# Patient Record
Sex: MALE | Race: WHITE | NOT HISPANIC OR LATINO | Employment: FULL TIME | URBAN - METROPOLITAN AREA
[De-identification: names, ages, dates, MRNs, and addresses within clinical notes are randomized per-mention and may not be internally consistent; named-entity substitution may affect disease eponyms.]

---

## 2018-02-19 ENCOUNTER — OFFICE VISIT (OUTPATIENT)
Dept: INTERNAL MEDICINE | Facility: CLINIC | Age: 28
End: 2018-02-19
Payer: COMMERCIAL

## 2018-02-19 VITALS
TEMPERATURE: 98 F | HEIGHT: 70 IN | SYSTOLIC BLOOD PRESSURE: 136 MMHG | BODY MASS INDEX: 37.08 KG/M2 | DIASTOLIC BLOOD PRESSURE: 70 MMHG | HEART RATE: 108 BPM | WEIGHT: 259 LBS | OXYGEN SATURATION: 96 %

## 2018-02-19 DIAGNOSIS — K21.9 GASTROESOPHAGEAL REFLUX DISEASE WITHOUT ESOPHAGITIS: ICD-10-CM

## 2018-02-19 DIAGNOSIS — E66.09 CLASS 2 OBESITY DUE TO EXCESS CALORIES WITHOUT SERIOUS COMORBIDITY WITH BODY MASS INDEX (BMI) OF 37.0 TO 37.9 IN ADULT: ICD-10-CM

## 2018-02-19 PROBLEM — E66.812 CLASS 2 OBESITY WITHOUT SERIOUS COMORBIDITY WITH BODY MASS INDEX (BMI) OF 37.0 TO 37.9 IN ADULT: Status: ACTIVE | Noted: 2018-02-19

## 2018-02-19 PROBLEM — E66.9 CLASS 2 OBESITY WITHOUT SERIOUS COMORBIDITY WITH BODY MASS INDEX (BMI) OF 37.0 TO 37.9 IN ADULT: Status: ACTIVE | Noted: 2018-02-19

## 2018-02-19 PROCEDURE — 99202 OFFICE O/P NEW SF 15 MIN: CPT | Mod: ,,, | Performed by: INTERNAL MEDICINE

## 2018-02-19 NOTE — PROGRESS NOTES
Subjective:       Patient ID: Allan Donahue is a 27 y.o. male.    Chief Complaint: Establish Care (indigestion)    Gastroesophageal Reflux   He complains of early satiety (2-3 times per week) and heartburn. He reports no abdominal pain, no chest pain, no choking, no coughing, no dysphagia, no nausea or no wheezing. This is a chronic problem. The current episode started more than 1 year ago. The problem occurs occasionally. The problem has been gradually worsening. The heartburn duration is several minutes. The heartburn is located in the substernum. The heartburn is of severe intensity. The heartburn wakes him from sleep. The heartburn does not limit his activity. The heartburn changes with position. The symptoms are aggravated by lying down. Associated symptoms include fatigue. Pertinent negatives include no melena or weight loss. Risk factors include obesity and caffeine use (drinks coffee couple times per week). He has tried an antacid for the symptoms. The treatment provided significant relief.     Review of Systems   Constitutional: Positive for appetite change (increased) and fatigue. Negative for chills, diaphoresis, fever, unexpected weight change and weight loss.   HENT: Negative for congestion, ear discharge, ear pain, hearing loss, postnasal drip, rhinorrhea, trouble swallowing and voice change.    Eyes: Positive for photophobia. Negative for pain, discharge, redness, itching and visual disturbance.   Respiratory: Positive for shortness of breath (positonal;  mostly when bends over to tie shoes). Negative for apnea, cough, choking, chest tightness and wheezing.    Cardiovascular: Negative for chest pain, palpitations and leg swelling.   Gastrointestinal: Positive for heartburn. Negative for abdominal pain, blood in stool, constipation, diarrhea, dysphagia, melena, nausea, rectal pain and vomiting.   Endocrine: Negative for cold intolerance, heat intolerance, polydipsia and polyuria.   Genitourinary:  Negative for decreased urine volume, difficulty urinating, discharge, dysuria, flank pain, frequency, genital sores, hematuria, penile pain, penile swelling, scrotal swelling, testicular pain and urgency.   Musculoskeletal: Positive for neck stiffness. Negative for arthralgias, back pain, gait problem, joint swelling, myalgias and neck pain.   Skin: Negative for color change, rash and wound.   Allergic/Immunologic: Negative for environmental allergies and food allergies.   Neurological: Negative for dizziness, tremors, seizures, syncope, facial asymmetry, speech difficulty, weakness, light-headedness, numbness and headaches.   Hematological: Negative for adenopathy. Does not bruise/bleed easily.   Psychiatric/Behavioral: Negative for confusion, hallucinations, sleep disturbance and suicidal ideas. The patient is not nervous/anxious.        Past Medical History:   Diagnosis Date    Class 2 obesity without serious comorbidity with body mass index (BMI) of 37.0 to 37.9 in adult 2/19/2018    GERD (gastroesophageal reflux disease)       Past Surgical History:   Procedure Laterality Date    APPENDECTOMY         Family History   Problem Relation Age of Onset    DIANA disease Father      severe    Hypertension Brother        Social History     Social History    Marital status: Single     Spouse name: N/A    Number of children: N/A    Years of education: N/A     Occupational History    technician      Social History Main Topics    Smoking status: Former Smoker    Smokeless tobacco: Never Used    Alcohol use Yes      Comment: occasional    Drug use: No    Sexual activity: Not Currently     Other Topics Concern    None     Social History Narrative    None       Current Outpatient Prescriptions   Medication Sig Dispense Refill    ranitidine (ZANTAC) 150 MG tablet Take 1 tablet (150 mg total) by mouth 2 (two) times daily. 60 tablet 3     No current facility-administered medications for this visit.        Review of  "patient's allergies indicates:  No Known Allergies  Objective:      Blood pressure 136/70, pulse 108, temperature 97.9 °F (36.6 °C), temperature source Temporal, height 5' 10" (1.778 m), weight 117.5 kg (259 lb), SpO2 96 %. Body mass index is 37.16 kg/m².   Physical Exam   Constitutional: He appears well-developed.   HENT:   Head: Normocephalic and atraumatic.   Right Ear: Hearing, tympanic membrane, external ear and ear canal normal.   Left Ear: Hearing, tympanic membrane, external ear and ear canal normal.   Nose: Nose normal.   Mouth/Throat: Uvula is midline and oropharynx is clear and moist.   Eyes: Conjunctivae, EOM and lids are normal. Pupils are equal, round, and reactive to light. Right eye exhibits no discharge. Left eye exhibits no discharge. Right conjunctiva is not injected. Right conjunctiva has no hemorrhage. Left conjunctiva is not injected. Left conjunctiva has no hemorrhage. No scleral icterus.   Neck: Carotid bruit is not present. No thyromegaly present.   Cardiovascular: Normal rate, regular rhythm and normal heart sounds.  Exam reveals no gallop and no friction rub.    No murmur heard.  Pulses:       Dorsalis pedis pulses are 2+ on the right side, and 2+ on the left side.   Pulmonary/Chest: Effort normal and breath sounds normal. No respiratory distress. He has no wheezes. He has no rhonchi. He has no rales.   Abdominal: Soft. Bowel sounds are normal. He exhibits no distension, no abdominal bruit, no pulsatile midline mass and no mass. There is no hepatosplenomegaly. There is no tenderness. There is no rebound and no guarding. No hernia.   Musculoskeletal: He exhibits no edema.   Lymphadenopathy:     He has no cervical adenopathy.   Neurological: He is alert. He has normal reflexes. He displays no tremor. No cranial nerve deficit.   Skin: Skin is warm and dry.   Psychiatric: He has a normal mood and affect. His speech is normal and behavior is normal.   Nursing note and vitals reviewed.    "       Assessment:       1. Gastroesophageal reflux disease without esophagitis    2. Class 2 obesity due to excess calories without serious comorbidity with body mass index (BMI) of 37.0 to 37.9 in adult        Plan:       Allan was seen today for establish care.    Diagnoses and all orders for this visit:    Gastroesophageal reflux disease without esophagitis  Comments:  H2 blocker is effective so will continue that.  Discussed elevating HOB, weight loss.  Check H pylori    Orders:  -     ranitidine (ZANTAC) 150 MG tablet; Take 1 tablet (150 mg total) by mouth 2 (two) times daily.  -     H.Pylori Antibody IgG; Future  -     CBC auto differential; Future  -     H.Pylori Antibody IgG  -     CBC auto differential    Class 2 obesity due to excess calories without serious comorbidity with body mass index (BMI) of 37.0 to 37.9 in adult  Comments:  Discussed diet and exercise.  Has labs doen at work;  drop off copies.  Orders:  -     TSH; Future  -     TSH

## 2020-10-26 ENCOUNTER — OFFICE VISIT (OUTPATIENT)
Dept: FAMILY MEDICINE | Facility: CLINIC | Age: 30
End: 2020-10-26
Payer: COMMERCIAL

## 2020-10-26 VITALS
TEMPERATURE: 97 F | OXYGEN SATURATION: 97 % | HEART RATE: 77 BPM | SYSTOLIC BLOOD PRESSURE: 130 MMHG | HEIGHT: 70 IN | WEIGHT: 266 LBS | DIASTOLIC BLOOD PRESSURE: 70 MMHG | BODY MASS INDEX: 38.08 KG/M2

## 2020-10-26 DIAGNOSIS — R41.840 ATTENTION DEFICIT: ICD-10-CM

## 2020-10-26 DIAGNOSIS — B35.3 TINEA PEDIS OF RIGHT FOOT: ICD-10-CM

## 2020-10-26 DIAGNOSIS — E66.09 CLASS 2 OBESITY DUE TO EXCESS CALORIES WITHOUT SERIOUS COMORBIDITY WITH BODY MASS INDEX (BMI) OF 38.0 TO 38.9 IN ADULT: ICD-10-CM

## 2020-10-26 DIAGNOSIS — Z13.220 LIPID SCREENING: ICD-10-CM

## 2020-10-26 DIAGNOSIS — R42 DIZZINESS: Primary | ICD-10-CM

## 2020-10-26 DIAGNOSIS — R09.81 NASAL CONGESTION: ICD-10-CM

## 2020-10-26 DIAGNOSIS — B35.6 TINEA CRURIS: ICD-10-CM

## 2020-10-26 DIAGNOSIS — R63.5 WEIGHT GAIN: ICD-10-CM

## 2020-10-26 DIAGNOSIS — R06.02 SOB (SHORTNESS OF BREATH): ICD-10-CM

## 2020-10-26 PROCEDURE — 99215 PR OFFICE/OUTPT VISIT, EST, LEVL V, 40-54 MIN: ICD-10-PCS | Mod: S$GLB,,, | Performed by: NURSE PRACTITIONER

## 2020-10-26 PROCEDURE — 99215 OFFICE O/P EST HI 40 MIN: CPT | Mod: S$GLB,,, | Performed by: NURSE PRACTITIONER

## 2020-10-26 PROCEDURE — 3008F PR BODY MASS INDEX (BMI) DOCUMENTED: ICD-10-PCS | Mod: S$GLB,,, | Performed by: NURSE PRACTITIONER

## 2020-10-26 PROCEDURE — 3008F BODY MASS INDEX DOCD: CPT | Mod: S$GLB,,, | Performed by: NURSE PRACTITIONER

## 2020-10-26 RX ORDER — FLUTICASONE PROPIONATE 50 MCG
1 SPRAY, SUSPENSION (ML) NASAL 2 TIMES DAILY
Qty: 16 G | Refills: 1 | Status: SHIPPED | OUTPATIENT
Start: 2020-10-26 | End: 2020-12-04

## 2020-10-26 RX ORDER — TOLNAFTATE 1 G/100G
POWDER TOPICAL 2 TIMES DAILY
Qty: 45 G | Refills: 1 | Status: SHIPPED | OUTPATIENT
Start: 2020-10-26 | End: 2021-06-28

## 2020-10-26 RX ORDER — CLOTRIMAZOLE AND BETAMETHASONE DIPROPIONATE 10; .64 MG/G; MG/G
CREAM TOPICAL 2 TIMES DAILY
Qty: 45 G | Refills: 1 | Status: SHIPPED | OUTPATIENT
Start: 2020-10-26 | End: 2020-11-25

## 2020-10-26 NOTE — LETTER
October 26, 2020    Allan Donahue  [address] [place] [address]         [facility]  [address] [place] [address]  [place] [address]  Phone: [phone]  Fax: [phone] October 26, 2020     Patient: Allan Donahue   YOB: 1990   Date of Visit: 10/26/2020       To Whom It May Concern:    It is my medical opinion that Allan Donahue may return to full duty immediately with as needed allowance for breaks needed to avoid respiratory distress cause by mask wearing.    If you have any questions or concerns, please don't hesitate to call.    Sincerely,        DEEPIKA Anthony

## 2020-10-26 NOTE — PATIENT INSTRUCTIONS
"  Understanding Body Mass Index (BMI)  Body mass index (BMI) is a method of screening for a weight category using the ratio of your height to your weight. The BMI is a measure of overweight that is corrected for height. Knowing your BMI is a way to tell if you are at a healthy weight. The higher your BMI, the greater your risk for weight-related health problems.  What BMI means  · BMI below 18.5: Underweight  · BMI 18.5 to 24.9: Healthy weight or "ideal body weight"   · BMI 25 to 29.9: Overweight  · BMI 30 and over: Obese  · BMI 40 and over: Severe obesity   Online BMI Calculators  Find your BMI with an online BMI calculator tool, such as these from the CDC:  · BMI calculator for adults  · BMI calculator for children and teens   Using the BMI chart  To figure out your BMI, find your height and weight (or the numbers closest to them) on the table below. Follow each column of numbers to where your height and weight meet on the table. That is your BMI.    Date Last Reviewed: 7/1/2016 © 2000-2017 [employer]. [address] [place] [address]. All rights reserved. This information is not intended as a substitute for professional medical care. Always follow your healthcare professional's instructions.        Dizziness (Uncertain Cause)  Dizziness is a common symptom. It may be described as lightheadedness, spinning, or feeling like you are going to faint. Dizziness can have many causes.  Be sure to tell the healthcare provider about:  · All medicines you take, including prescription, over-the-counter, herbs, and supplements  · Any other symptoms you have  · Any health problems you are being treated for  · Anything that causes the dizziness to get worse or better  Today's exam did not show an exact cause for your dizziness. Other tests may be needed. Follow up with your healthcare provider.  Home care  · Dizziness that occurs with sudden standing may be a sign of mild dehydration. Drink extra fluids " for the next few days.  · If you recently started a new medicine, stopped a medicine, or had the dose of a current medicine changed, talk with the prescribing healthcare provider. Your medicine plan may need adjustment.  · If dizziness lasts more than a few seconds, sit or lie down until it passes. This may help prevent injury in case you pass out.  · Do not drive or use power tools or dangerous equipment until you have had no dizziness for at least 48 hours.  Follow-up care  Follow up with your healthcare provider for further evaluation within the next 7 days or as advised.  When to seek medical advice  Call your healthcare provider for any of the following:  · Worsening of symptoms or new symptoms  · Passing out or seizure  · Repeated vomiting  · Headache  · Palpitations (the sense that your heart is fluttering or beating fast or hard)  · Shortness of breath  · Blood in vomit or stool (black or red color)  · Weakness of an arm or leg or one side of the face  · Vision or hearing changes  · Trouble walking or speaking  · Chest, arm, neck, back, or jaw pain  Date Last Reviewed: 8/23/2015  © 9032-3232 [employer]. [address] [place] [id number]. All rights reserved. This information is not intended as a substitute for professional medical care. Always follow your healthcare professional's instructions.

## 2020-10-26 NOTE — PROGRESS NOTES
Subjective:       Patient ID: Allan Donahue is a 30 y.o. male.    Chief Complaint: Establish Care, fungus (bilat. feet), Dizziness, and problems concetrating    Patient is a new patient to me but established patient of the practice.  Patient was seen by Dr. Espinosa about 2 years ago and did not follow up or obtain labs as requested.  Patient is here today to establish with me and obtain evaluation for several conditions and complaints.  Patient does have a family history of diabetes.  Patient suffers with fungal infections of the feet and complains of groin itching too.  Patient has used OTC lotrimin once daily on the feet with no improvement after a few weeks.  Patient has used nothing on the groin area.      Patient has congestion that is recurrent.  Patient has used mucinex without relief of the congestion.  Patient is looking for other options for this recurrent problem.    Patient indicates he has trouble wearing a mask without breaks at work.  Patient works in a controlled environment at the [employer] facility and has to wear a mask constantly.  Recently, he experienced some episodes of dizziness and shortness of breath he feels is from wearing the mask.  Patient requests a letter stating he can take breaks as needed for work.    Patient has a history of ADD as a child and took adderall.  Patient is looking to going back to school and is requesting evaluation of medication help to help with focus.  Patient is not established with psychiatry and will be referred today for full evaluation.    Patient is obese with a BMI of 38.17    Shortness of Breath  This is a recurrent problem. The current episode started more than 1 month ago (when wearing a face mask at work). The problem occurs constantly. The problem has been waxing and waning. Associated symptoms include a rash. Pertinent negatives include no abdominal pain, chest pain, claudication, coryza, ear pain, fever, headaches, hemoptysis, leg pain, leg swelling,  neck pain, orthopnea, PND, rhinorrhea, sore throat, sputum production, swollen glands, syncope, vomiting or wheezing. The symptoms are aggravated by any activity. The patient has no known risk factors for DVT/PE. He has tried rest for the symptoms. The treatment provided mild relief. (Sleep apnea)   Rash  This is a new problem. The current episode started more than 1 month ago. The problem has been waxing and waning since onset. The affected locations include the right toes. The rash is characterized by dryness, itchiness and peeling. He was exposed to nothing. Associated symptoms include congestion and shortness of breath. Pertinent negatives include no anorexia, cough, diarrhea, eye pain, fatigue, fever, nail changes, rhinorrhea, sore throat or vomiting. Past treatments include moisturizer and anti-itch cream (otc lotrimin). The treatment provided mild relief. (Sleep apnea)   Foot Injury   The incident occurred more than 1 week ago. There was no injury mechanism. The pain is present in the right heel and left heel. The quality of the pain is described as aching and stabbing. The pain is moderate. The pain has been intermittent since onset. Associated symptoms include an inability to bear weight. Pertinent negatives include no loss of motion, loss of sensation, muscle weakness, numbness or tingling. He reports no foreign bodies present. The symptoms are aggravated by weight bearing. He has tried non-weight bearing, rest, NSAIDs and immobilization (stretching) for the symptoms. The treatment provided moderate relief.   Dizziness:   Chronicity:  New  Onset:  1 to 4 weeks ago  Progression since onset:  Resolved, then recurrent  Frequency:  Every few hours  Duration:  1 minute  Dizziness characteristics:  Lightheaded/impending faint and hyperventilation  Frequency of Spells:  Daily (with mask wearing)   Associated symptoms: weakness and light-headedness.no hearing loss, no ear pain, no fever, no headaches, no nausea,  no vomiting, no diaphoresis, no syncope, no palpitations and no chest pain.  Aggravated by:  Exertion (mask wearing)  Treatments tried:  Rest  Improvements on treatment:  Moderate   sleep apnea    Review of Systems   Constitutional: Negative for appetite change, chills, diaphoresis, fatigue, fever and unexpected weight change.        Obesity   HENT: Positive for congestion. Negative for ear discharge, ear pain, hearing loss, rhinorrhea, sore throat, trouble swallowing and voice change.    Eyes: Negative for photophobia, pain and visual disturbance.   Respiratory: Positive for shortness of breath. Negative for cough, hemoptysis, sputum production, chest tightness and wheezing.    Cardiovascular: Negative for chest pain, palpitations, orthopnea, claudication, leg swelling, syncope and PND.   Gastrointestinal: Negative for abdominal pain, anorexia, constipation, diarrhea, nausea and vomiting.   Endocrine: Negative for cold intolerance and heat intolerance.   Genitourinary: Negative for difficulty urinating, dysuria and flank pain.   Musculoskeletal: Negative for arthralgias, gait problem, myalgias and neck pain.   Skin: Positive for color change and rash. Negative for nail changes.   Allergic/Immunologic: Negative for immunocompromised state.   Neurological: Positive for dizziness, weakness and light-headedness. Negative for tingling, numbness and headaches.   Hematological: Negative for adenopathy.   Psychiatric/Behavioral: Positive for decreased concentration. Negative for agitation, confusion, self-injury and suicidal ideas.       Past Medical History:   Diagnosis Date    Class 2 obesity without serious comorbidity with body mass index (BMI) of 37.0 to 37.9 in adult 2/19/2018    GERD (gastroesophageal reflux disease)       Past Surgical History:   Procedure Laterality Date    APPENDECTOMY         Family History   Problem Relation Age of Onset    DIANA disease Father         severe    Hypertension Brother         Social History     Socioeconomic History    Marital status: Single     Spouse name: Not on file    Number of children: Not on file    Years of education: Not on file    Highest education level: Not on file   Occupational History    Occupation: technician   Social Needs    Financial resource strain: Not on file    Food insecurity     Worry: Not on file     Inability: Not on file    Transportation needs     Medical: Not on file     Non-medical: Not on file   Tobacco Use    Smoking status: Former Smoker    Smokeless tobacco: Never Used   Substance and Sexual Activity    Alcohol use: Yes     Comment: occasional    Drug use: No    Sexual activity: Yes     Partners: Female   Lifestyle    Physical activity     Days per week: Not on file     Minutes per session: Not on file    Stress: Only a little   Relationships    Social connections     Talks on phone: Not on file     Gets together: Not on file     Attends Mandaeism service: Not on file     Active member of club or organization: Not on file     Attends meetings of clubs or organizations: Not on file     Relationship status: Not on file   Other Topics Concern    Not on file   Social History Narrative    Not on file       Current Outpatient Medications   Medication Sig Dispense Refill    clotrimazole-betamethasone 1-0.05% (LOTRISONE) cream Apply topically 2 (two) times daily. Apply to the feet 45 g 1    fluticasone propionate (FLONASE) 50 mcg/actuation nasal spray 1 spray (50 mcg total) by Each Nostril route 2 (two) times daily. 16 g 1    tolnaftate (TINACTIN) 1 % powder Apply topically 2 (two) times daily. 45 g 1     No current facility-administered medications for this visit.        Review of patient's allergies indicates:  No Known Allergies  Objective:    HPI     fungus      Additional comments: bilat. feet          Last edited by Graciela Chavez MA on 10/26/2020  8:39 AM. (History)      Blood pressure 130/70, pulse 77, temperature 97.1 °F  "(36.2 °C), height 5' 10" (1.778 m), weight 120.7 kg (266 lb), SpO2 97 %. Body mass index is 38.17 kg/m².   Physical Exam  Vitals signs and nursing note reviewed.   Constitutional:       General: He is not in acute distress.     Appearance: Normal appearance. He is well-developed. He is obese. He is not ill-appearing.   HENT:      Head: Normocephalic and atraumatic.      Right Ear: Tympanic membrane, ear canal and external ear normal.      Left Ear: Tympanic membrane, ear canal and external ear normal.      Nose: Nose normal.      Mouth/Throat:      Lips: Pink.      Mouth: Mucous membranes are moist.      Tongue: No lesions.      Pharynx: Oropharynx is clear. Uvula midline.      Tonsils: No tonsillar exudate.   Eyes:      General: Lids are normal.      Extraocular Movements: Extraocular movements intact.      Conjunctiva/sclera: Conjunctivae normal.      Pupils: Pupils are equal, round, and reactive to light.   Neck:      Musculoskeletal: Normal range of motion and neck supple.   Cardiovascular:      Rate and Rhythm: Normal rate and regular rhythm.      Pulses: Normal pulses.           Dorsalis pedis pulses are 2+ on the right side and 2+ on the left side.        Posterior tibial pulses are 2+ on the right side and 2+ on the left side.      Heart sounds: Normal heart sounds, S1 normal and S2 normal. No murmur.   Pulmonary:      Effort: Pulmonary effort is normal. No respiratory distress.      Breath sounds: Normal breath sounds. No decreased air movement. No decreased breath sounds, wheezing or rhonchi.   Abdominal:      General: Bowel sounds are normal. There is no distension.      Palpations: Abdomen is soft.      Tenderness: There is no abdominal tenderness. There is no rebound.   Musculoskeletal: Normal range of motion.      Right foot: Normal range of motion. No deformity.      Left foot: Normal range of motion. No deformity.        Feet:    Feet:      Right foot:      Skin integrity: Skin breakdown, callus and " dry skin present.   Lymphadenopathy:      Cervical: No cervical adenopathy.   Skin:     General: Skin is warm and dry.      Capillary Refill: Capillary refill takes less than 2 seconds.      Findings: No rash.   Neurological:      General: No focal deficit present.      Mental Status: He is alert and oriented to person, place, and time. Mental status is at baseline.   Psychiatric:         Attention and Perception: He is inattentive.         Mood and Affect: Mood normal. Affect is labile.         Speech: Speech normal.         Behavior: Behavior normal. Behavior is cooperative.         Thought Content: Thought content normal.         Cognition and Memory: Cognition normal.         Judgment: Judgment normal.             Assessment:       1. Dizziness    2. SOB (shortness of breath)    3. Tinea pedis of right foot    4. Attention deficit    5. Weight gain    6. Lipid screening    7. Tinea cruris    8. Nasal congestion    9. Class 2 obesity due to excess calories without serious comorbidity with body mass index (BMI) of 38.0 to 38.9 in adult        Plan:       Allan was seen today for establish care, fungus, dizziness and problems concetrating.    Diagnoses and all orders for this visit:    Dizziness  -     CBC auto differential; Future  -     Comprehensive Metabolic Panel; Future  -     TSH; Future  -     Urinalysis; Future  -     CBC auto differential  -     Comprehensive Metabolic Panel  -     TSH  -     Urinalysis    SOB (shortness of breath)  Stable.  Only with mask wearing.    Tinea pedis of right foot  -     clotrimazole-betamethasone 1-0.05% (LOTRISONE) cream; Apply topically 2 (two) times daily. Apply to the feet    Attention deficit  -     CBC auto differential; Future  -     Comprehensive Metabolic Panel; Future  -     CBC auto differential  -     Comprehensive Metabolic Panel  -     Ambulatory referral/consult to Psychiatry; Future  Follow up with psychiatry for mediation management.    Weight gain  -      TSH; Future  -     TSH  Labs to determine    Lipid screening  -     Lipid Panel; Future  -     Lipid Panel    Tinea cruris  -     tolnaftate (TINACTIN) 1 % powder; Apply topically 2 (two) times daily.    Nasal congestion  -     fluticasone propionate (FLONASE) 50 mcg/actuation nasal spray; 1 spray (50 mcg total) by Each Nostril route 2 (two) times daily.    Class 2 obesity due to excess calories without serious comorbidity with body mass index (BMI) of 38.0 to 38.9 in adult  The patient is asked to make an attempt to improve diet and exercise patterns to aid in medical management of this problem.           Labs to determine cause of problems with screenings also ordered. Follow up in 2-4 weeks for lab review.

## 2020-11-02 ENCOUNTER — PATIENT MESSAGE (OUTPATIENT)
Dept: FAMILY MEDICINE | Facility: CLINIC | Age: 30
End: 2020-11-02

## 2020-11-06 ENCOUNTER — PATIENT MESSAGE (OUTPATIENT)
Dept: FAMILY MEDICINE | Facility: CLINIC | Age: 30
End: 2020-11-06

## 2020-11-24 LAB
ALBUMIN SERPL-MCNC: 4.3 G/DL (ref 3.6–5.1)
ALBUMIN/GLOB SERPL: 1.5 (CALC) (ref 1–2.5)
ALP SERPL-CCNC: 59 U/L (ref 36–130)
ALT SERPL-CCNC: 23 U/L (ref 9–46)
APPEARANCE UR: CLEAR
AST SERPL-CCNC: 12 U/L (ref 10–40)
BASOPHILS # BLD AUTO: 23 CELLS/UL (ref 0–200)
BASOPHILS NFR BLD AUTO: 0.4 %
BILIRUB SERPL-MCNC: 0.4 MG/DL (ref 0.2–1.2)
BILIRUB UR QL STRIP: NEGATIVE
BUN SERPL-MCNC: 17 MG/DL (ref 7–25)
BUN/CREAT SERPL: ABNORMAL (CALC) (ref 6–22)
CALCIUM SERPL-MCNC: 9.6 MG/DL (ref 8.6–10.3)
CHLORIDE SERPL-SCNC: 107 MMOL/L (ref 98–110)
CHOLEST SERPL-MCNC: 146 MG/DL
CHOLEST/HDLC SERPL: 4.1 (CALC)
CO2 SERPL-SCNC: 26 MMOL/L (ref 20–32)
COLOR UR: YELLOW
CREAT SERPL-MCNC: 0.96 MG/DL (ref 0.6–1.35)
EOSINOPHIL # BLD AUTO: 80 CELLS/UL (ref 15–500)
EOSINOPHIL NFR BLD AUTO: 1.4 %
ERYTHROCYTE [DISTWIDTH] IN BLOOD BY AUTOMATED COUNT: 11.9 % (ref 11–15)
GFRSERPLBLD MDRD-ARVRAT: 104 ML/MIN/1.73M2
GLOBULIN SER CALC-MCNC: 2.9 G/DL (CALC) (ref 1.9–3.7)
GLUCOSE SERPL-MCNC: 104 MG/DL (ref 65–99)
GLUCOSE UR QL STRIP: NEGATIVE
HCT VFR BLD AUTO: 48 % (ref 38.5–50)
HDLC SERPL-MCNC: 36 MG/DL
HGB BLD-MCNC: 15.5 G/DL (ref 13.2–17.1)
HGB UR QL STRIP: NEGATIVE
KETONES UR QL STRIP: NEGATIVE
LDLC SERPL CALC-MCNC: 85 MG/DL (CALC)
LEUKOCYTE ESTERASE UR QL STRIP: NEGATIVE
LYMPHOCYTES # BLD AUTO: 1756 CELLS/UL (ref 850–3900)
LYMPHOCYTES NFR BLD AUTO: 30.8 %
MCH RBC QN AUTO: 29.4 PG (ref 27–33)
MCHC RBC AUTO-ENTMCNC: 32.3 G/DL (ref 32–36)
MCV RBC AUTO: 90.9 FL (ref 80–100)
MONOCYTES # BLD AUTO: 365 CELLS/UL (ref 200–950)
MONOCYTES NFR BLD AUTO: 6.4 %
NEUTROPHILS # BLD AUTO: 3477 CELLS/UL (ref 1500–7800)
NEUTROPHILS NFR BLD AUTO: 61 %
NITRITE UR QL STRIP: NEGATIVE
NONHDLC SERPL-MCNC: 110 MG/DL (CALC)
PH UR STRIP: 6.5 [PH] (ref 5–8)
PLATELET # BLD AUTO: 206 THOUSAND/UL (ref 140–400)
PMV BLD REES-ECKER: 12 FL (ref 7.5–12.5)
POTASSIUM SERPL-SCNC: 4.2 MMOL/L (ref 3.5–5.3)
PROT SERPL-MCNC: 7.2 G/DL (ref 6.1–8.1)
PROT UR QL STRIP: NEGATIVE
RBC # BLD AUTO: 5.28 MILLION/UL (ref 4.2–5.8)
SODIUM SERPL-SCNC: 142 MMOL/L (ref 135–146)
SP GR UR STRIP: 1.03 (ref 1–1.03)
TRIGL SERPL-MCNC: 146 MG/DL
TSH SERPL-ACNC: 0.88 MIU/L (ref 0.4–4.5)
WBC # BLD AUTO: 5.7 THOUSAND/UL (ref 3.8–10.8)

## 2020-12-04 ENCOUNTER — OFFICE VISIT (OUTPATIENT)
Dept: FAMILY MEDICINE | Facility: CLINIC | Age: 30
End: 2020-12-04
Payer: COMMERCIAL

## 2020-12-04 VITALS
WEIGHT: 260 LBS | SYSTOLIC BLOOD PRESSURE: 128 MMHG | TEMPERATURE: 98 F | HEIGHT: 70 IN | BODY MASS INDEX: 37.22 KG/M2 | HEART RATE: 69 BPM | DIASTOLIC BLOOD PRESSURE: 72 MMHG | OXYGEN SATURATION: 97 %

## 2020-12-04 DIAGNOSIS — R73.01 IMPAIRED FASTING GLUCOSE: Primary | ICD-10-CM

## 2020-12-04 DIAGNOSIS — R09.81 NASAL CONGESTION: ICD-10-CM

## 2020-12-04 DIAGNOSIS — E66.09 CLASS 2 OBESITY DUE TO EXCESS CALORIES WITHOUT SERIOUS COMORBIDITY WITH BODY MASS INDEX (BMI) OF 37.0 TO 37.9 IN ADULT: ICD-10-CM

## 2020-12-04 DIAGNOSIS — E78.6 LOW HDL (UNDER 40): ICD-10-CM

## 2020-12-04 PROCEDURE — 1126F PR PAIN SEVERITY QUANTIFIED, NO PAIN PRESENT: ICD-10-PCS | Mod: S$GLB,,, | Performed by: NURSE PRACTITIONER

## 2020-12-04 PROCEDURE — 99213 PR OFFICE/OUTPT VISIT, EST, LEVL III, 20-29 MIN: ICD-10-PCS | Mod: S$GLB,,, | Performed by: NURSE PRACTITIONER

## 2020-12-04 PROCEDURE — 3008F BODY MASS INDEX DOCD: CPT | Mod: S$GLB,,, | Performed by: NURSE PRACTITIONER

## 2020-12-04 PROCEDURE — 3008F PR BODY MASS INDEX (BMI) DOCUMENTED: ICD-10-PCS | Mod: S$GLB,,, | Performed by: NURSE PRACTITIONER

## 2020-12-04 PROCEDURE — 1126F AMNT PAIN NOTED NONE PRSNT: CPT | Mod: S$GLB,,, | Performed by: NURSE PRACTITIONER

## 2020-12-04 PROCEDURE — 99213 OFFICE O/P EST LOW 20 MIN: CPT | Mod: S$GLB,,, | Performed by: NURSE PRACTITIONER

## 2020-12-04 RX ORDER — AZELASTINE 1 MG/ML
1 SPRAY, METERED NASAL 2 TIMES DAILY
Qty: 30 ML | Refills: 2 | Status: SHIPPED | OUTPATIENT
Start: 2020-12-04 | End: 2022-01-24

## 2020-12-04 NOTE — PATIENT INSTRUCTIONS
Prediabetes  You have been diagnosed with prediabetes. This means that the level of sugar (glucose) in your blood is too high. If you have prediabetes, you are at risk for developing type 2 diabetes. Type 2 diabetes is diagnosed when the level of glucose in the blood reaches a certain high level. With prediabetes, it hasnt reached this point yet, but it is higher than normal. It is vital to make lifestyle changes to lower your blood sugar, improve your health, and prevent diabetes. This sheet will tell you more.      Why worry about prediabetes?  Prediabetes is a disease where the bodys cells have trouble using glucose in the blood for energy. As a result, too much glucose stays in the blood and can affect how your heart and blood vessels work. Without changes in diet and lifestyle, the problem can get worse. Once you have type 2 diabetes, it is chronic (ongoing) and needs to be managed for the rest of your life. Diabetes can harm the body and your health by damaging organs, such as your eyes and kidneys. It makes you more likely to have heart disease. And it can damage nerves and blood vessels.  Who is a risk for prediabetes?  The exact cause of prediabetes is not clear. But certain risk factors make a person more likely to have it. These include:  · A family history of type 2 diabetes  · Being overweight  · Being over age 45  · Have hypertension or elevated cholesterol   · Having had gestational diabetes  · Not being physically active  · Being ,  American, , , , or   Diagnosing prediabetes  Prediabetes may have no symptoms or you may have some of the symptoms of diabetes. The diagnosis is made with a blood test. You may have one or more of these blood tests:   · Fasting glucose test. Blood is taken and tested after you have fasted (not eaten) for at least 8 hours. A normal test result is 99 milligrams per deciliter (mg/dL) or lower.  Prediabetes is 100 mg/dL to 125 mg/dL. Diabetes is 126 mg/dL or higher.  · Glucose tolerance test. Your blood sugar is measured before and after you drink a very sugary liquid. A normal test result is 139 milligrams per deciliter (mg/dL) or lower. Prediabetes is 140 mg/dL to 199 mg/dL. Diabetes is 200 mg/dL or higher.  · Hemoglobin A1c (HbA1c). Your HbA1c is normal if it is below 5.7%. Prediabetes is 5.7% to 6.4%. Diabetes is 6.5% or higher.   Treating prediabetes  The best way to treat prediabetes is to lose at least 5% to 7% of your current weight and be more physically active by getting at least 150 minutes a week of physical activity. When sitting for long periods of time, get up for short sessions of light activity every 30 minutes. These changes help the bodys cells use blood sugar better. Even a small amount of weight loss can help. Work with your healthcare provider to make a plan to eat well and be more active. Keep in mind that small changes can add up. Other changes in your lifestyle (or even taking certain medicines, such as metformin) may make you less likely to develop diabetes. Your healthcare provider can talk with you about these.  Follow-up  If it is untreated, prediabetes can turn into diabetes. This is a serious health condition. Take steps to stop this from happening. Follow the treatment plan you have been given. You may have your blood glucose tested again in about 12 to 18 months.  Symptoms of diabetes  Let your healthcare provider know if you have any of the following:  · Always feel very tired  · Feel very thirsty or hungry much of the time  · Have to urinate often  · Lose weight for no reason  · Feel numbness or tingling in your fingers or toes  · Have cuts or bruises that dont seem to heal  · Have blurry vision   Date Last Reviewed: 5/1/2016  © 4326-4628 [employer]. [address] [place] [address], [place] [address]. All rights reserved. This information is not intended as a  substitute for professional medical care. Always follow your healthcare professional's instructions.        Understanding Carbohydrates, Fats, and Protein  Food is a source of fuel and nourishment for your body. Its also a source of pleasure. Having diabetes doesnt mean you have to eat special foods or give up desserts. Instead, your dietitian can show you how to plan meals to suit your body. To start, learn how different foods affect blood sugar.  Carbohydrates  Carbohydrates are the main source of fuel for the body. Carbohydrates raise blood sugar. Many people think carbohydrates are only found in pasta or bread. But carbohydrates are actually in many kinds of foods:  · Sugars occur naturally in foods such as fruit, milk, honey, and molasses. Sugars can also be added to many foods, from cereals and yogurt to candy and desserts. Sugars raise blood sugar.  · Starches are found in bread, cereals, pasta, and dried beans. Theyre also found in corn, peas, potatoes, yam, acorn squash, and butternut squash. Starches also raise blood sugar.   · Fiber is found in foods such as vegetables, fruits, beans, and whole grains. Unlike other carbs, fiber isnt digested or absorbed. So it doesnt raise blood sugar. In fact, fiber can help keep blood sugar from rising too fast. It also helps keep blood cholesterol at a healthy level.  Did you know?  Even though carbohydrates raise blood sugar, its best to have some in every meal. They are an important part of a healthy diet.   Fat  Fat is an energy source that can be stored until needed. Fat does not raise blood sugar. However, it can raise blood cholesterol, increasing the risk of heart disease. Fat is also high in calories, which can cause weight gain. Not all types of fat are the same.  More Healthy:  · Monounsaturated fats are mostly found in vegetable oils, such as olive, canola, and peanut oils. They are also found in avocados and some nuts. Monounsaturated fats are healthy  for your heart. Thats because they lower LDL (unhealthy) cholesterol.  · Polyunsaturated fats are mostly found in vegetable oils, such as corn, safflower, and soybean oils. They are also found in some seeds, nuts, and fish. Polyunsaturated fats lower LDL (unhealthy) cholesterol. So, choosing them instead of saturated fats is healthy for your heart. Certain unsaturated fats can help lower triglycerides.   Less Healthy:  · Saturated fats are found in animal products, such as meat, poultry, whole milk, lard, and butter. Saturated fats raise LDL cholesterol and are not healthy for your heart.  · Hydrogenated oils and trans fats are formed when vegetable oils are processed into solid fats. They are found in many processed foods. Hydrogenated oils and trans fats raise LDL cholesterol and lower HDL (healthy) cholesterol. They are not healthy for your heart.  Protein  Protein helps the body build and repair muscle and other tissue. Protein has little or no effect on blood sugar. However, many foods that contain protein also contain saturated fat. By choosing low-fat protein sources, you can get the benefits of protein without the extra fat:  · Plant protein is found in dry beans and peas, nuts, and soy products, such as tofu and soymilk. These sources tend to be cholesterol-free and low in saturated fat.  · Animal protein is found in fish, poultry, meat, cheese, milk, and eggs. These contain cholesterol and can be high in saturated fat. Aim for lean, lower-fat choices.  Date Last Reviewed: 3/1/2016  © 0734-0945 [employer]. [address] [place] [address], [place] [address]. All rights reserved. This information is not intended as a substitute for professional medical care. Always follow your healthcare professional's instructions.        Diet: Diabetes  Food is an important tool that you can use to control diabetes and stay healthy. Eating well-balanced meals in the correct amounts will help you control your blood  glucose levels and prevent low blood sugar reactions. It will also help you reduce the health risks of diabetes. There is no one specific diet that is right for everyone with diabetes. But there are general guidelines to follow. A registered dietitian (RD) will create a tailored diet approach thats just right for you. He or she will also help you plan healthy meals and snacks. If you have any questions, call your dietitian for advice.     Guidelines for success  Talk with your healthcare provider before starting a diabetes diet or weight loss program. If you haven't talked with a dietitian yet, ask your provider for a referral. The following guidelines can help you succeed:  · Select foods from the 6 food groups below. Your dietitian will help you find food choices within each group. He or she will also show you serving sizes and how many servings you can have at each meal.  ¨ Grains, beans, and starchy vegetables  ¨ Vegetables  ¨ Fruit  ¨ Milk or yogurt  ¨ Meat, poultry, fish, or tofu  ¨ Healthy fats  · Check your blood sugar levels as directed by your provider. Take any medicine as prescribed by your provider.  · Learn to read food labels and pick the right portion sizes.  · Eat only the amount of food in your meal plan. Eat about the same amount of food at regular times each day. Dont skip meals. Eat meals 4 to 5 hours apart, with snacks in between.  · Limit alcohol. It raises blood sugar levels. Drink water or calorie-free diet drinks that use safe sweeteners.  · Eat less fat to help lower your risk of heart disease. Use nonfat or low-fat dairy products and lean meats. Avoid fried foods. Use cooking oils that are unsaturated, such as olive, canola, or peanut oil.  · Talk with your dietitian about safe sugar substitutes.  · Avoid added salt. It can contribute to high blood pressure, which can cause heart disease. People with diabetes already have a risk of high blood pressure and heart disease.  · Stay at a  healthy weight. If you need to lose weight, cut down on your portion sizes. But dont skip meals. Exercise is an important part of any weight management program. Talk with your provider about an exercise program thats right for you.  · For more information about the best diet plan for you, talk with a registered dietitian (RD). To find an RD in your area, contact:  ¨ Academy of Nutrition and Dietetics www.eatright.org  ¨ The American Diabetes Association [phone] www.diabetes.org  Date Last Reviewed: 8/1/2016  © 1838-6178 [employer]. [address] [place] [address], [place] [address]. All rights reserved. This information is not intended as a substitute for professional medical care. Always follow your healthcare professional's instructions.

## 2020-12-04 NOTE — PROGRESS NOTES
Subjective:       Patient ID: Allan Donahue is a 30 y.o. male.    Chief Complaint: Results (lab review)    Patient presents today following up for lab review in reason for symptoms.  Patient had problems with weight gain and was worried about thyroid disorders.  Patient's labs were [place] normal.  Blood sugar is slightly elevated.  Patient indicates that he was fasting for 12 hours prior to lab draw all.  For patient's blood sugar is slightly elevated at 104.  Patient does have a history diabetes in his family and concerned about possibility of diabetes.  Patient also has low HDL cholesterol.  This is new to have abnormalities in the cholesterol patient reports.  Patient has been lately improving his diet with control of carbohydrates in sugary type foods.  Patient is attempting to lose weight.  All other labs were within acceptable range patient will be advised on diet and lifestyle to help with these problems.  Patient also presented with a fungal infection of the feet.  Patient was prescribed Lotrisone and rash has improved significantly.  Rash has resolved.  Patient is obese with a BMI of 37.31     Review of Systems   Constitutional: Negative for appetite change, chills, diaphoresis, fatigue, fever and unexpected weight change.        Obesity   HENT: Negative for congestion, ear discharge, ear pain, hearing loss, sore throat, trouble swallowing and voice change.    Eyes: Negative for photophobia, pain and visual disturbance.   Respiratory: Negative for cough, chest tightness and shortness of breath.    Cardiovascular: Negative for chest pain, palpitations and leg swelling.        Low hdl   Gastrointestinal: Negative for abdominal pain, constipation, diarrhea, nausea and vomiting.   Endocrine: Negative for cold intolerance and heat intolerance.        Impaired fasting glucose   Genitourinary: Negative for difficulty urinating, dysuria and flank pain.   Musculoskeletal: Negative for arthralgias, gait problem and  myalgias.   Skin: Negative for rash.   Allergic/Immunologic: Negative for immunocompromised state.   Neurological: Negative for dizziness, weakness and headaches.   Hematological: Negative for adenopathy.   Psychiatric/Behavioral: Negative for agitation, confusion, self-injury and suicidal ideas.       Past Medical History:   Diagnosis Date    Class 2 obesity without serious comorbidity with body mass index (BMI) of 37.0 to 37.9 in adult 2/19/2018    GERD (gastroesophageal reflux disease)       Past Surgical History:   Procedure Laterality Date    APPENDECTOMY         Family History   Problem Relation Age of Onset    DIANA disease Father         severe    Hypertension Brother        Social History     Socioeconomic History    Marital status: Single     Spouse name: Not on file    Number of children: Not on file    Years of education: Not on file    Highest education level: Not on file   Occupational History    Occupation: technician   Social Needs    Financial resource strain: Not on file    Food insecurity     Worry: Not on file     Inability: Not on file    Transportation needs     Medical: Not on file     Non-medical: Not on file   Tobacco Use    Smoking status: Former Smoker    Smokeless tobacco: Never Used   Substance and Sexual Activity    Alcohol use: Yes     Comment: occasional    Drug use: No    Sexual activity: Yes     Partners: Female   Lifestyle    Physical activity     Days per week: Not on file     Minutes per session: Not on file    Stress: Only a little   Relationships    Social connections     Talks on phone: Not on file     Gets together: Not on file     Attends Congregation service: Not on file     Active member of club or organization: Not on file     Attends meetings of clubs or organizations: Not on file     Relationship status: Not on file   Other Topics Concern    Not on file   Social History Narrative    Not on file       Current Outpatient Medications   Medication Sig  "Dispense Refill    tolnaftate (TINACTIN) 1 % powder Apply topically 2 (two) times daily. 45 g 1    azelastine (ASTELIN) 137 mcg (0.1 %) nasal spray 1 spray (137 mcg total) by Nasal route 2 (two) times daily. 30 mL 2     No current facility-administered medications for this visit.        Review of patient's allergies indicates:  No Known Allergies  Objective:    HPI     Results      Additional comments: lab review          Last edited by Graciela Chavez MA on 12/4/2020  9:53 AM. (History)      Blood pressure 128/72, pulse 69, temperature 97.5 °F (36.4 °C), height 5' 10" (1.778 m), weight 117.9 kg (260 lb), SpO2 97 %. Body mass index is 37.31 kg/m².   Physical Exam  Vitals signs and nursing note reviewed.   Constitutional:       General: He is not in acute distress.     Appearance: Normal appearance. He is well-developed. He is obese.   HENT:      Head: Normocephalic and atraumatic.      Right Ear: External ear normal.      Left Ear: External ear normal.      Nose: Nose normal.      Mouth/Throat:      Mouth: Mucous membranes are moist.      Pharynx: Uvula midline.   Eyes:      General: Lids are normal.      Extraocular Movements: Extraocular movements intact.      Conjunctiva/sclera: Conjunctivae normal.      Pupils: Pupils are equal, round, and reactive to light.   Neck:      Musculoskeletal: Normal range of motion and neck supple.   Cardiovascular:      Rate and Rhythm: Normal rate and regular rhythm.      Pulses: Normal pulses.      Heart sounds: Normal heart sounds, S1 normal and S2 normal. No murmur.   Pulmonary:      Effort: Pulmonary effort is normal. No respiratory distress.      Breath sounds: Normal breath sounds.   Abdominal:      General: Bowel sounds are normal.      Palpations: Abdomen is soft.      Tenderness: There is no abdominal tenderness.   Musculoskeletal: Normal range of motion.   Lymphadenopathy:      Cervical: No cervical adenopathy.   Skin:     General: Skin is warm and dry.      Findings: " No rash.   Neurological:      General: No focal deficit present.      Mental Status: He is alert and oriented to person, place, and time. Mental status is at baseline.   Psychiatric:         Mood and Affect: Mood normal.         Speech: Speech normal.         Behavior: Behavior normal.         Thought Content: Thought content normal.         Judgment: Judgment normal.             Assessment:       1. Impaired fasting glucose    2. Low HDL (under 40)    3. Nasal congestion    4. Class 2 obesity due to excess calories without serious comorbidity with body mass index (BMI) of 37.0 to 37.9 in adult        Plan:       Allan was seen today for results.    Diagnoses and all orders for this visit:    Impaired fasting glucose  -     Comprehensive Metabolic Panel; Future  -     Comprehensive Metabolic Panel   Discussed the following complications of DM Type 2: CAD, CVD, Retinopathy, Nephropathy, Neuropathy.    Discussed Target A1C Goal <7.0% and Target fasting blood sugars () before each meal.    Discussed Lifestyle Modifications: Low glycemic index diet, Exercise (30-45 min) daily, Weight loss, and Smoking cessation     Low HDL (under 40)  -     Lipid Panel; Future  -     Lipid Panel    Nasal congestion  -     azelastine (ASTELIN) 137 mcg (0.1 %) nasal spray; 1 spray (137 mcg total) by Nasal route 2 (two) times daily.  Stop flonase.  Patient reports not being effective after 2 weeks of use.      ENT referral may be needed if astelin is not effective.    Class 2 obesity due to excess calories without serious comorbidity with body mass index (BMI) of 37.0 to 37.9 in adult  The patient is asked to make an attempt to improve diet and exercise patterns to aid in medical management of this problem.\

## 2021-05-12 ENCOUNTER — PATIENT MESSAGE (OUTPATIENT)
Dept: RESEARCH | Facility: HOSPITAL | Age: 31
End: 2021-05-12

## 2021-06-14 ENCOUNTER — TELEPHONE (OUTPATIENT)
Dept: FAMILY MEDICINE | Facility: CLINIC | Age: 31
End: 2021-06-14

## 2021-06-14 DIAGNOSIS — R73.01 IMPAIRED FASTING GLUCOSE: Primary | ICD-10-CM

## 2021-06-14 DIAGNOSIS — E78.6 LOW HDL (UNDER 40): ICD-10-CM

## 2021-06-26 LAB
ALBUMIN SERPL-MCNC: 4.4 G/DL (ref 3.6–5.1)
ALBUMIN/GLOB SERPL: 1.5 (CALC) (ref 1–2.5)
ALP SERPL-CCNC: 58 U/L (ref 36–130)
ALT SERPL-CCNC: 21 U/L (ref 9–46)
AST SERPL-CCNC: 16 U/L (ref 10–40)
BILIRUB SERPL-MCNC: 0.7 MG/DL (ref 0.2–1.2)
BUN SERPL-MCNC: 16 MG/DL (ref 7–25)
BUN/CREAT SERPL: NORMAL (CALC) (ref 6–22)
CALCIUM SERPL-MCNC: 9.3 MG/DL (ref 8.6–10.3)
CHLORIDE SERPL-SCNC: 103 MMOL/L (ref 98–110)
CHOLEST SERPL-MCNC: 177 MG/DL
CHOLEST/HDLC SERPL: 5.1 (CALC)
CO2 SERPL-SCNC: 26 MMOL/L (ref 20–32)
CREAT SERPL-MCNC: 0.95 MG/DL (ref 0.6–1.35)
GLOBULIN SER CALC-MCNC: 2.9 G/DL (CALC) (ref 1.9–3.7)
GLUCOSE SERPL-MCNC: 89 MG/DL (ref 65–99)
HDLC SERPL-MCNC: 35 MG/DL
LDLC SERPL CALC-MCNC: 111 MG/DL (CALC)
NONHDLC SERPL-MCNC: 142 MG/DL (CALC)
POTASSIUM SERPL-SCNC: 3.8 MMOL/L (ref 3.5–5.3)
PROT SERPL-MCNC: 7.3 G/DL (ref 6.1–8.1)
SODIUM SERPL-SCNC: 140 MMOL/L (ref 135–146)
TRIGL SERPL-MCNC: 185 MG/DL

## 2021-06-28 ENCOUNTER — OFFICE VISIT (OUTPATIENT)
Dept: FAMILY MEDICINE | Facility: CLINIC | Age: 31
End: 2021-06-28
Payer: COMMERCIAL

## 2021-06-28 VITALS
WEIGHT: 253 LBS | OXYGEN SATURATION: 97 % | DIASTOLIC BLOOD PRESSURE: 70 MMHG | HEIGHT: 70 IN | HEART RATE: 97 BPM | BODY MASS INDEX: 36.22 KG/M2 | SYSTOLIC BLOOD PRESSURE: 124 MMHG | TEMPERATURE: 98 F

## 2021-06-28 DIAGNOSIS — J30.9 CHRONIC ALLERGIC RHINITIS: Primary | ICD-10-CM

## 2021-06-28 DIAGNOSIS — E78.1 HYPERTRIGLYCERIDEMIA: ICD-10-CM

## 2021-06-28 DIAGNOSIS — E66.01 CLASS 2 SEVERE OBESITY DUE TO EXCESS CALORIES WITH SERIOUS COMORBIDITY AND BODY MASS INDEX (BMI) OF 36.0 TO 36.9 IN ADULT: ICD-10-CM

## 2021-06-28 DIAGNOSIS — G89.29 CHRONIC BILATERAL LOW BACK PAIN, UNSPECIFIED WHETHER SCIATICA PRESENT: ICD-10-CM

## 2021-06-28 DIAGNOSIS — E78.5 MILD HYPERLIPIDEMIA: ICD-10-CM

## 2021-06-28 DIAGNOSIS — M54.50 CHRONIC BILATERAL LOW BACK PAIN, UNSPECIFIED WHETHER SCIATICA PRESENT: ICD-10-CM

## 2021-06-28 DIAGNOSIS — R73.01 IMPAIRED FASTING GLUCOSE: ICD-10-CM

## 2021-06-28 PROCEDURE — 3008F PR BODY MASS INDEX (BMI) DOCUMENTED: ICD-10-PCS | Mod: S$GLB,,, | Performed by: NURSE PRACTITIONER

## 2021-06-28 PROCEDURE — 1126F AMNT PAIN NOTED NONE PRSNT: CPT | Mod: S$GLB,,, | Performed by: NURSE PRACTITIONER

## 2021-06-28 PROCEDURE — 1126F PR PAIN SEVERITY QUANTIFIED, NO PAIN PRESENT: ICD-10-PCS | Mod: S$GLB,,, | Performed by: NURSE PRACTITIONER

## 2021-06-28 PROCEDURE — 3008F BODY MASS INDEX DOCD: CPT | Mod: S$GLB,,, | Performed by: NURSE PRACTITIONER

## 2021-06-28 PROCEDURE — 99213 PR OFFICE/OUTPT VISIT, EST, LEVL III, 20-29 MIN: ICD-10-PCS | Mod: S$GLB,,, | Performed by: NURSE PRACTITIONER

## 2021-06-28 PROCEDURE — 99213 OFFICE O/P EST LOW 20 MIN: CPT | Mod: S$GLB,,, | Performed by: NURSE PRACTITIONER

## 2021-06-28 RX ORDER — DEXTROAMPHETAMINE SACCHARATE, AMPHETAMINE ASPARTATE MONOHYDRATE, DEXTROAMPHETAMINE SULFATE AND AMPHETAMINE SULFATE 7.5; 7.5; 7.5; 7.5 MG/1; MG/1; MG/1; MG/1
CAPSULE, EXTENDED RELEASE ORAL EVERY MORNING
COMMUNITY
Start: 2021-06-05 | End: 2022-10-03

## 2022-01-17 ENCOUNTER — PATIENT MESSAGE (OUTPATIENT)
Dept: PULMONOLOGY | Facility: CLINIC | Age: 32
End: 2022-01-17

## 2022-01-17 ENCOUNTER — OFFICE VISIT (OUTPATIENT)
Dept: PULMONOLOGY | Facility: CLINIC | Age: 32
End: 2022-01-17
Payer: COMMERCIAL

## 2022-01-17 VITALS
DIASTOLIC BLOOD PRESSURE: 90 MMHG | BODY MASS INDEX: 37.94 KG/M2 | OXYGEN SATURATION: 97 % | SYSTOLIC BLOOD PRESSURE: 160 MMHG | HEART RATE: 120 BPM | HEIGHT: 70 IN | WEIGHT: 265 LBS

## 2022-01-17 DIAGNOSIS — Z01.812 PRE-PROCEDURE LAB EXAM: ICD-10-CM

## 2022-01-17 DIAGNOSIS — G47.33 OSA (OBSTRUCTIVE SLEEP APNEA): ICD-10-CM

## 2022-01-17 DIAGNOSIS — G47.10 HYPERSOMNOLENCE: Primary | ICD-10-CM

## 2022-01-17 PROCEDURE — 3077F SYST BP >= 140 MM HG: CPT | Mod: S$GLB,,, | Performed by: NURSE PRACTITIONER

## 2022-01-17 PROCEDURE — 3008F PR BODY MASS INDEX (BMI) DOCUMENTED: ICD-10-PCS | Mod: S$GLB,,, | Performed by: NURSE PRACTITIONER

## 2022-01-17 PROCEDURE — 3080F PR MOST RECENT DIASTOLIC BLOOD PRESSURE >= 90 MM HG: ICD-10-PCS | Mod: S$GLB,,, | Performed by: NURSE PRACTITIONER

## 2022-01-17 PROCEDURE — 99204 OFFICE O/P NEW MOD 45 MIN: CPT | Mod: S$GLB,,, | Performed by: NURSE PRACTITIONER

## 2022-01-17 PROCEDURE — 99204 PR OFFICE/OUTPT VISIT, NEW, LEVL IV, 45-59 MIN: ICD-10-PCS | Mod: S$GLB,,, | Performed by: NURSE PRACTITIONER

## 2022-01-17 PROCEDURE — 3008F BODY MASS INDEX DOCD: CPT | Mod: S$GLB,,, | Performed by: NURSE PRACTITIONER

## 2022-01-17 PROCEDURE — 3077F PR MOST RECENT SYSTOLIC BLOOD PRESSURE >= 140 MM HG: ICD-10-PCS | Mod: S$GLB,,, | Performed by: NURSE PRACTITIONER

## 2022-01-17 PROCEDURE — 3080F DIAST BP >= 90 MM HG: CPT | Mod: S$GLB,,, | Performed by: NURSE PRACTITIONER

## 2022-01-17 NOTE — PROGRESS NOTES
SUBJECTIVE:    Patient ID: Allan Donahue is a 31 y.o. male.    Chief Complaint: Establish Care (Possible angeline)    HPI     Patient here today with known severe obstructive sleep apnea per a home study from two years ago.  Patient currently sleeping on an autobipap.  He states that he felt his machine worked wonderful for a long time. He was not falling asleep at rest or feeling exhausted all day.  However, over the past year his symptoms have returned. He is hypersomnolent, falling asleep throughout the day.  He is waking up exhausted.  He takes Adderall for ADD and this helps with some of the day time fatigue.  His weight is stable compared to his home study.  He does have thick facial hair which may be causing leak.  I am not able to pull a download from his chip.  His epworth score is 15 despite sleeping on autoBIPAP.  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  Tobacco History:  reports that he has quit smoking. He has never used smokeless tobacco.  Drug History:  reports no history of drug use.    Review of patient's allergies indicates:  No Known Allergies    Current Outpatient Medications   Medication Sig Dispense Refill    dextroamphetamine-amphetamine (ADDERALL XR) 30 MG 24 hr capsule Take by mouth every morning.      azelastine (ASTELIN) 137 mcg (0.1 %) nasal spray 1 spray (137 mcg total) by Nasal route 2 (two) times daily. (Patient not taking: Reported on  "6/28/2021) 30 mL 2     No current facility-administered medications for this visit.         Review of Systems  General: Feeling Well. Hypersomnolence   Eyes: Vision is good.  ENT:  One nostril always clogged.   Heart:: No chest pain or palpitations.  Lungs: No cough, sputum, or wheezing.  GI: No Nausea, vomiting, constipation, diarrhea, or reflux.  : No dysuria, hesitancy, or nocturia.  Musculoskeletal: No joint pain or myalgias.  Skin: No lesions or rashes.  Neuro: No headaches or neuropathy.  Lymph: No edema or adenopathy.  Psych: No anxiety or depression.  Endo: No weight change since last study     OBJECTIVE:      BP (!) 160/90 (BP Location: Left arm, Patient Position: Sitting, BP Method: Medium (Manual))   Pulse (!) 120   Ht 5' 10" (1.778 m)   Wt 120.2 kg (265 lb)   SpO2 97%   BMI 38.02 kg/m²     Physical Exam  GENERAL: Older patient in no distress.  HEENT: Pupils equal and reactive. Extraocular movements intact. Nose intact.      Pharynx moist. malampatti 4  NECK: Supple.  17 inches   HEART: Regular rate and rhythm. No murmur or gallop auscultated.  LUNGS: Clear to auscultation and percussion. Lung excursion symmetrical. No change in fremitus. No adventitial noises.  ABDOMEN: Bowel sounds present. Non-tender, no masses palpated.  EXTREMITIES: Normal muscle tone and joint movement, no cyanosis or clubbing.   LYMPHATICS: No adenopathy palpated, no edema.  SKIN: Dry, intact, no lesions.   NEURO: Cranial nerves II-XII intact. Motor strength 5/5 bilaterally, upper and lower extremities.  PSYCH: Appropriate affect.    Assessment:       1. Hypersomnolence    2. HARRIS (obstructive sleep apnea)    3. Pre-procedure lab exam        epworth score is 15  Plan:       Hypersomnolence  -     CPAP Titration (Must have dx of HARRIS from previous sleep study.); Future; Expected date: 01/17/2022    HARRIS (obstructive sleep apnea)  -     CPAP Titration (Must have dx of HARRIS from previous sleep study.); Future; Expected date: " 01/17/2022    Pre-procedure lab exam  -     COVID-19 Routine Screening       needs in lab titration - failed autobipap therapy    need to trim facial hair, may be leaking because of   Follow up in about 3 months (around 4/17/2022).

## 2022-01-21 LAB
ALBUMIN SERPL-MCNC: 4.4 G/DL (ref 3.6–5.1)
ALBUMIN/GLOB SERPL: 1.4 (CALC) (ref 1–2.5)
ALP SERPL-CCNC: 53 U/L (ref 36–130)
ALT SERPL-CCNC: 32 U/L (ref 9–46)
AST SERPL-CCNC: 21 U/L (ref 10–40)
BILIRUB SERPL-MCNC: 0.4 MG/DL (ref 0.2–1.2)
BUN SERPL-MCNC: 19 MG/DL (ref 7–25)
BUN/CREAT SERPL: NORMAL (CALC) (ref 6–22)
CALCIUM SERPL-MCNC: 9.3 MG/DL (ref 8.6–10.3)
CHLORIDE SERPL-SCNC: 104 MMOL/L (ref 98–110)
CHOLEST SERPL-MCNC: 203 MG/DL
CHOLEST/HDLC SERPL: 6.2 (CALC)
CO2 SERPL-SCNC: 23 MMOL/L (ref 20–32)
CREAT SERPL-MCNC: 1.07 MG/DL (ref 0.6–1.35)
GLOBULIN SER CALC-MCNC: 3.1 G/DL (CALC) (ref 1.9–3.7)
GLUCOSE SERPL-MCNC: 72 MG/DL (ref 65–99)
HDLC SERPL-MCNC: 33 MG/DL
LDLC SERPL CALC-MCNC: ABNORMAL MG/DL (CALC)
NONHDLC SERPL-MCNC: 170 MG/DL (CALC)
POTASSIUM SERPL-SCNC: 3.8 MMOL/L (ref 3.5–5.3)
PROT SERPL-MCNC: 7.5 G/DL (ref 6.1–8.1)
SODIUM SERPL-SCNC: 141 MMOL/L (ref 135–146)
TRIGL SERPL-MCNC: 435 MG/DL

## 2022-01-24 ENCOUNTER — OFFICE VISIT (OUTPATIENT)
Dept: FAMILY MEDICINE | Facility: CLINIC | Age: 32
End: 2022-01-24
Payer: COMMERCIAL

## 2022-01-24 VITALS
DIASTOLIC BLOOD PRESSURE: 70 MMHG | WEIGHT: 267 LBS | HEIGHT: 70 IN | TEMPERATURE: 99 F | OXYGEN SATURATION: 97 % | BODY MASS INDEX: 38.22 KG/M2 | SYSTOLIC BLOOD PRESSURE: 120 MMHG | HEART RATE: 110 BPM

## 2022-01-24 DIAGNOSIS — E66.01 CLASS 2 SEVERE OBESITY DUE TO EXCESS CALORIES WITH SERIOUS COMORBIDITY AND BODY MASS INDEX (BMI) OF 38.0 TO 38.9 IN ADULT: ICD-10-CM

## 2022-01-24 DIAGNOSIS — R73.01 IMPAIRED FASTING GLUCOSE: Primary | ICD-10-CM

## 2022-01-24 DIAGNOSIS — J30.9 CHRONIC ALLERGIC RHINITIS: ICD-10-CM

## 2022-01-24 DIAGNOSIS — E78.1 HYPERTRIGLYCERIDEMIA: ICD-10-CM

## 2022-01-24 DIAGNOSIS — E78.2 MIXED HYPERLIPIDEMIA: ICD-10-CM

## 2022-01-24 PROCEDURE — 3078F DIAST BP <80 MM HG: CPT | Mod: S$GLB,,, | Performed by: NURSE PRACTITIONER

## 2022-01-24 PROCEDURE — 3008F PR BODY MASS INDEX (BMI) DOCUMENTED: ICD-10-PCS | Mod: S$GLB,,, | Performed by: NURSE PRACTITIONER

## 2022-01-24 PROCEDURE — 1160F PR REVIEW ALL MEDS BY PRESCRIBER/CLIN PHARMACIST DOCUMENTED: ICD-10-PCS | Mod: S$GLB,,, | Performed by: NURSE PRACTITIONER

## 2022-01-24 PROCEDURE — 99213 OFFICE O/P EST LOW 20 MIN: CPT | Mod: S$GLB,,, | Performed by: NURSE PRACTITIONER

## 2022-01-24 PROCEDURE — 3074F PR MOST RECENT SYSTOLIC BLOOD PRESSURE < 130 MM HG: ICD-10-PCS | Mod: S$GLB,,, | Performed by: NURSE PRACTITIONER

## 2022-01-24 PROCEDURE — 99213 PR OFFICE/OUTPT VISIT, EST, LEVL III, 20-29 MIN: ICD-10-PCS | Mod: S$GLB,,, | Performed by: NURSE PRACTITIONER

## 2022-01-24 PROCEDURE — 3074F SYST BP LT 130 MM HG: CPT | Mod: S$GLB,,, | Performed by: NURSE PRACTITIONER

## 2022-01-24 PROCEDURE — 3008F BODY MASS INDEX DOCD: CPT | Mod: S$GLB,,, | Performed by: NURSE PRACTITIONER

## 2022-01-24 PROCEDURE — 1160F RVW MEDS BY RX/DR IN RCRD: CPT | Mod: S$GLB,,, | Performed by: NURSE PRACTITIONER

## 2022-01-24 PROCEDURE — 3078F PR MOST RECENT DIASTOLIC BLOOD PRESSURE < 80 MM HG: ICD-10-PCS | Mod: S$GLB,,, | Performed by: NURSE PRACTITIONER

## 2022-01-24 RX ORDER — ICOSAPENT ETHYL 1000 MG/1
2 CAPSULE ORAL 2 TIMES DAILY
Qty: 360 CAPSULE | Refills: 1 | Status: SHIPPED | OUTPATIENT
Start: 2022-01-24 | End: 2022-10-03 | Stop reason: SDUPTHER

## 2022-01-24 NOTE — PROGRESS NOTES
Subjective:       Patient ID: Allan Donahue is a 31 y.o. male.    Chief Complaint: Hyperlipidemia, Impaired Fasting Glucose, and lab review    Patient presents today following up for impaired fasting glucose, cholesterol, and chronic sinus problems. Patient did not see ENT but still has significant nasal congestion and difficulty breathing out of the nose.  Patient is asking for information on the referral again.   Patient admits to a poor diet and lack of exercise recently and noted the cholesterol has worsened significantly.   Patient is obese with a BMI of 38.31    Hyperlipidemia  This is a recurrent problem. The current episode started more than 1 month ago. The problem is uncontrolled. Recent lipid tests were reviewed and are high. Exacerbating diseases include obesity. He has no history of hypothyroidism. Factors aggravating his hyperlipidemia include fatty foods. Pertinent negatives include no chest pain, focal weakness, leg pain, myalgias or shortness of breath. He is currently on no antihyperlipidemic treatment. The current treatment provides no improvement of lipids. Compliance problems include adherence to exercise and adherence to diet.  Risk factors for coronary artery disease include dyslipidemia, obesity and male sex.   Sinus Problem  This is a recurrent problem. The current episode started more than 1 month ago. The problem has been waxing and waning since onset. There has been no fever. The pain is mild. Associated symptoms include congestion, headaches and sneezing. Pertinent negatives include no chills, coughing, diaphoresis, ear pain, shortness of breath, sinus pressure or sore throat. Past treatments include saline sprays and oral decongestants (flonase). The treatment provided mild relief.     Review of Systems   Constitutional: Negative for activity change, appetite change, chills, diaphoresis, fatigue, fever and unexpected weight change.        Obesity   HENT: Positive for congestion and  sneezing. Negative for ear discharge, ear pain, hearing loss, postnasal drip, sinus pressure, sinus pain, sore throat, trouble swallowing and voice change.    Eyes: Negative for photophobia, pain and visual disturbance.   Respiratory: Negative for cough, chest tightness and shortness of breath.    Cardiovascular: Negative for chest pain, palpitations and leg swelling.        Hyperlipidemia   Gastrointestinal: Negative for abdominal pain, constipation, diarrhea, nausea and vomiting.   Endocrine: Negative for cold intolerance and heat intolerance.        Impaired fasting glucose improved   Genitourinary: Negative for difficulty urinating, dysuria and flank pain.   Musculoskeletal: Negative for arthralgias, gait problem and myalgias.   Skin: Negative for rash.   Allergic/Immunologic: Negative for immunocompromised state.   Neurological: Positive for headaches. Negative for dizziness, focal weakness and weakness.   Hematological: Negative for adenopathy.   Psychiatric/Behavioral: Negative for agitation, confusion, self-injury and suicidal ideas.       Past Medical History:   Diagnosis Date    ADD (attention deficit disorder)     Class 2 obesity without serious comorbidity with body mass index (BMI) of 37.0 to 37.9 in adult 2/19/2018    GERD (gastroesophageal reflux disease)     HARRIS (obstructive sleep apnea)       Past Surgical History:   Procedure Laterality Date    APPENDECTOMY         Family History   Problem Relation Age of Onset    DIANA disease Father         severe    Hypertension Brother        Social History     Socioeconomic History    Marital status: Single   Occupational History    Occupation: technician   Tobacco Use    Smoking status: Former Smoker    Smokeless tobacco: Never Used   Substance and Sexual Activity    Alcohol use: Yes     Comment: occasional    Drug use: No    Sexual activity: Yes     Partners: Female       Current Outpatient Medications   Medication Sig Dispense Refill     "dextroamphetamine-amphetamine (ADDERALL XR) 30 MG 24 hr capsule Take by mouth every morning.      VASCEPA 1 gram Cap Take 2 capsules (2 g total) by mouth 2 (two) times daily. 360 capsule 1     No current facility-administered medications for this visit.       Review of patient's allergies indicates:  No Known Allergies  Objective:      Blood pressure 120/70, pulse 110, temperature 98.7 °F (37.1 °C), height 5' 10" (1.778 m), weight 121.1 kg (267 lb), SpO2 97 %. Body mass index is 38.31 kg/m².   Physical Exam  Vitals and nursing note reviewed.   Constitutional:       General: He is not in acute distress.     Appearance: Normal appearance. He is well-developed. He is obese.   HENT:      Head: Normocephalic and atraumatic.      Right Ear: Ear canal and external ear normal.      Left Ear: Ear canal and external ear normal.      Nose: Nose normal.      Mouth/Throat:      Mouth: Mucous membranes are moist.      Pharynx: Uvula midline. Oropharyngeal exudate (clear pnd) present.   Eyes:      General: Lids are normal.      Extraocular Movements: Extraocular movements intact.      Conjunctiva/sclera: Conjunctivae normal.      Pupils: Pupils are equal, round, and reactive to light.   Cardiovascular:      Rate and Rhythm: Normal rate and regular rhythm.      Pulses: Normal pulses.      Heart sounds: Normal heart sounds, S1 normal and S2 normal. No murmur heard.      Pulmonary:      Effort: Pulmonary effort is normal. No respiratory distress.      Breath sounds: Normal breath sounds. No wheezing.   Abdominal:      General: Bowel sounds are normal.      Palpations: Abdomen is soft.      Tenderness: There is no abdominal tenderness.   Musculoskeletal:         General: Normal range of motion.      Cervical back: Normal range of motion and neck supple.   Lymphadenopathy:      Cervical: No cervical adenopathy.   Skin:     General: Skin is warm and dry.      Findings: No rash.   Neurological:      General: No focal deficit present.    "   Mental Status: He is alert and oriented to person, place, and time. Mental status is at baseline.      Cranial Nerves: No cranial nerve deficit.   Psychiatric:         Mood and Affect: Mood normal.         Speech: Speech normal.         Behavior: Behavior normal.         Thought Content: Thought content normal.         Judgment: Judgment normal.             Assessment:       1. Impaired fasting glucose    2. Hypertriglyceridemia    3. Mixed hyperlipidemia    4. Chronic allergic rhinitis    5. Class 2 severe obesity due to excess calories with serious comorbidity and body mass index (BMI) of 38.0 to 38.9 in adult        Plan:       Allan was seen today for hyperlipidemia, impaired fasting glucose and lab review.    Diagnoses and all orders for this visit:    Impaired fasting glucose  -     Comprehensive Metabolic Panel; Future  -     Comprehensive Metabolic Panel    Hypertriglyceridemia  -     VASCEPA 1 gram Cap; Take 2 capsules (2 g total) by mouth 2 (two) times daily.  -     Lipid Panel; Future  -     Lipid Panel    Mixed hyperlipidemia  -     VASCEPA 1 gram Cap; Take 2 capsules (2 g total) by mouth 2 (two) times daily.  -     Lipid Panel; Future  -     Lipid Panel    Starting on vasepa    Limit red meat, butter, fried foods, cheese, and other foods that have a lot of saturated fat. Consume more: lean meats, fish, fruits, vegetables, whole grains, beans, lentils, and nuts.  Weight loss, and 30-45 min of cardiovascular exercise daily.   Chronic allergic rhinitis  See ENT as advised.    Class 2 severe obesity due to excess calories with serious comorbidity and body mass index (BMI) of 38.0 to 38.9 in adult  The patient is asked to make an attempt to improve diet and exercise patterns to aid in medical management of this problem.         Follow up in 4 months with repeat labs prior to office visit with diet changes and start of vascepa

## 2022-01-24 NOTE — PATIENT INSTRUCTIONS
Patient Education       Dietary Fats   About this topic   Fat is part of a healthy diet. How much fat you need is based on how many calories you need each day. The total amount of fat you eat should not be more than 35 percent of your calories for the day. All types of fat have 9 calories in each gram. Most foods have more than one kind of fat in them. Unsaturated fats are liquid at room temperature and may help your heart health.     What foods are good to eat?   Some kinds of fats like monounsaturated or polyunsaturated fats may help your blood cholesterol. You may see these kinds of fats listed on the food label.  Good sources of monounsaturated fat are:  · Olive, canola, peanut, and sesame oils  · Olives  · Peanut butter  · Nuts like almonds, peanuts, macadamia nuts, pecans, cashews, and hazelnuts  · Avocados  Good sources of polyunsaturated fat are:  · Fatty fish like salmon, tuna, and mackerel  · Soybean, corn, sunflower, and safflower oils  · Walnuts  · Seeds like sesame, pumpkin, ground flaxseed, and stanley  What foods should be limited or avoided?   Try to limit or avoid solid fats, like trans-fats and saturated fats. These kinds of fats raise your LDL (bad) cholesterol and raise your risk of heart disease. Read the label to see what kind of fats are in the food you want to eat.  Trans-fat is often found in:  · Store-baked pastries, cookies, doughnuts, muffins, cakes, and pie crusts  · Stick margarine  · Packaged snack foods like crackers or microwave popcorn  · Fried foods  · Frozen pizza  · Non-dairy creamers  · Candy  If you need to lower your cholesterol, limit saturated fat to 5 to 6 percent or less of your total calories each day. Saturated fat is found in:  · High fat meat  · Chicken with the skin  · Whole fat dairy products  · Butter  · Palm and coconut oil  · Lard  What problems could happen?   All fat has the same number of calories. When you eat more fat of any kind, you may take in too many  calories and gain weight. When you eat a large amount of saturated and trans-fat, you are at a higher risk for:  · Heart attack  · Stroke  · Other major health problems  Helpful tips   · Choose vegetable oils instead of solid fat when you bake or cook.  · Use dried beans or other legumes to replace some of the meat in casseroles and stews.  · Choose lean meat like fish or chicken without the skin.  · Limit your intake of fast food and fried foods.  · Use soft margarines over sticks of margarine.  · Eat two servings of fatty fish each week.  · Plan a snack of nuts or olives.  Where can I learn more?   American Heart Association  https://www.heart.org/en/health-topics/cholesterol/prevention-and-treatment-of-high-cholesterol-hyperlipidemia/the-skinny-on-fats   Last Reviewed Date   2021-05-18  Consumer Information Use and Disclaimer   This information is not specific medical advice and does not replace information you receive from your health care provider. This is only a brief summary of general information. It does NOT include all information about conditions, illnesses, injuries, tests, procedures, treatments, therapies, discharge instructions or life-style choices that may apply to you. You must talk with your health care provider for complete information about your health and treatment options. This information should not be used to decide whether or not to accept your health care providers advice, instructions or recommendations. Only your health care provider has the knowledge and training to provide advice that is right for you.  Copyright   Copyright © 2021 [employer] and its affiliates and/or licensors. All rights reserved.

## 2022-02-09 DIAGNOSIS — J32.8 OTHER CHRONIC SINUSITIS: Primary | ICD-10-CM

## 2022-02-14 ENCOUNTER — HOSPITAL ENCOUNTER (OUTPATIENT)
Dept: RADIOLOGY | Facility: HOSPITAL | Age: 32
Discharge: HOME | End: 2022-02-14
Attending: OTOLARYNGOLOGY
Payer: COMMERCIAL

## 2022-02-14 DIAGNOSIS — J32.8 OTHER CHRONIC SINUSITIS: ICD-10-CM

## 2022-02-14 PROCEDURE — 70486 CT MAXILLOFACIAL W/O DYE: CPT | Mod: TC,PO

## 2022-03-09 DIAGNOSIS — Z01.818 PRE-OP TESTING: ICD-10-CM

## 2022-03-16 ENCOUNTER — ANESTHESIA EVENT (OUTPATIENT)
Dept: SURGERY | Facility: AMBULARY SURGERY CENTER | Age: 32
End: 2022-03-16
Payer: COMMERCIAL

## 2022-03-16 NOTE — PATIENT INSTRUCTIONS
Postoperative Sinus and Nasal Surgery Instructions     Sleep with your head slightly elevated for 2-3 days.  No heavy lifting or straining for 7 days.  Do not blow your nose or sniff forcefully.  Sneeze with your mouth open if possible.  It is OK to wipe your nose gently.  It is normal to have mild bloody drainage for the first 24 to 72 hours.  If the bleeding worsens or persists, sit up and spray your nose with Afrin or generic oxymetazoline, 2-3 sprays in each side.  If nasal bleeding still does not stop, or if you have a constant drip of clear fluid from your nose, call your physician.  Starting the morning after surgery, unless you are instructed otherwise, wash your nose out with salt water twice a day per the instructions on the NASAL SALINE INSTRUCTION sheet.  Continue this until you are told it is okay to stop.  THIS IS VERY IMPORTANT FOR PROPER HEALING AND WILL MAKE CLEANING AT YOUR FIRST POSTOP VISIT MUCH EASIER FOR YOU.  Use your nasal steroid spray twice a day after irrigating with salt water.  Take your antibiotic or oral steroid pills if prescribed.  Take pain medicine as needed.  If the pain is mild, you may use Tylenol or generic acetaminophen.  Avoid aspirin or other anti-inflammatory medications.  If you have excessive or persistent pain, call your physician.  If you have any trouble with your vision or eye movement, or if you have bruising or swelling around the eyes, call your physician.  If you have persistent fever over 100°, call your physician.  Your first appointment will be approximately two weeks after your surgery.  In the unlikely event that you have a stent placed that needs to be removed, you will be given an appointment sooner.    For Questions or Emergency Care:  Call the office at [phone].  You may need to speak with the doctor on-call.      Sinus and Nasal Surgery     This information is provided to help you understand sinus and nasal surgery and to answer some of your  questions.  It is not meant to replace a discussion with your doctor and our clinical team.    SINUSES:  The sinuses are four cavities or rooms opening from each side of the nose.  The four sinuses are named maxillary, ethmoid, frontal, and sphenoid.  The purpose of sinuses is not completely understood.  Sinuses normally produce about a quart of mucus every day.  Sinus infections are one of the most common problems that cause a patient to go to a doctor.  Blockage of the sinuses due to a virus or allergies is one of several ways that sinus infections can occur.     Reasons for Sinus Surgery:  Decrease the severity and number of sinus infections  Make it easier to manage or cure sinus infections  Improve breathing if polyps are present  Prevent complications associated with blocked sinuses    Balloon Sinus Surgery:  Some patients may be a candidate for balloon sinus surgery.   In these cases, a balloon is used to dilate the sinus opening rather than the traditional method of removing some bone and soft tissue from the sinus opening.   We will use the most appropriate technique for your situation.    Expectations After Sinus Surgery:  Sinus surgery alone does not always completely cure the sinus problems.  Most people are much better after sinus surgery, but most patients will still need other medical treatment to keep the sinuses in the best possible shape.  Sinus surgery does not cure allergies.  Do not be discouraged if you do not feel better right away after the sinus surgery.  The sinus surgery just opens up the sinuses, but the lining that has been damaged by infections may take weeks or months to heal so that the sinuses work better again.  Be aware that if you have lost your sense of smell from sinus problems, it does not always return after sinus surgery.  Drainage down the back of your throat usually improves but rarely goes away completely.    Alternatives to Sinus Surgery:  You should have received  treatment with all reasonable medical options before considering sinus surgery.  If your condition is not dangerous or significantly affecting your quality of life, there is the option to just live with the problem.    Risks from Sinus Surgery:  Any surgery has some amount of risk associated with it.  Your health care provider will discuss these risks in detail with you.  This list does not include every single side effect that could possibly occur.    Uncommon:  Nosebleeds in the first few days after surgery  Recurrence of polyps or sinus infections  Rare:  Injury to the eye causing double vision or blindness  Spinal fluid leak  Loss of sense of smell  Brain injury or death    NASAL SEPTUM:  The septum of the nose is a wall made of cartilage and bone that divides the two sides of the nose.  The septum can be deviated or bent due to a broken nose or sometimes it just develops that way.  A deviated septum generally creates breathing problems on one side of the nose and does not change from time to time.    Reasons for Septal Surgery:  Improve breathing through the nose.  Reduce snoring.  Allow better access for sinus surgery.    Expectations after Septal Surgery:  Most people can breathe better through their nose after septal surgery.  It is impossible to get human tissue completely straight, but we can generally straighten your septum enough to help you breathe.  You may not breathe perfectly through your nose, but in most circumstances it will be a lot better.  You may still need other medical treatment for other conditions such as allergy.  Septal surgery does not cure allergies.    Alternatives to Septal Surgery:  You should have received treatment with all reasonable medical options before considering septal surgery.  There are not many good medical options other than surgery for a deviated septum.  If your septal deviation does not bother you much, there is the option to just live with the problem.    Risks  from Septal Surgery:  Any surgery has some amount of risk associated with it.  Your health care provider will discuss these risks in detail with you.  This list does not include every single side effect that could possibly occur.    Uncommon  Nosebleeds in the first few days after surgery  Failure to relieve the blockage or recurrence of the blockage  Rare  Change in the outside appearance of the nose  A hole in the septum that may cause crusting, bleeding, and a whistling noise    TURBINATES:  The turbinates of the nose are found on each side of the nasal cavity.  There are four turbinates on each side of the nose, but the lowest or inferior turbinates are the ones most likely to cause problems with the nose.   The turbinates are bones covered by lining that can shrink and swell due to allergies, temperature or humidity changes, and a variety of irritants.  They filter, warm, and humidify the air.  Sometimes the turbinates get too large and block breathing.  They usually block breathing on both sides of the nose, but the blockage may be worse on one side of the other depending on the time of the day.    Reasons for Turbinate Surgery:  Improve breathing through the nose.  Reduce snoring.    Expectations after Turbinate Surgery:  Most people can breathe better through their nose after turbinate surgery.  We can only remove a limited portion of the turbinates, but we can generally remove enough to help you breathe.  You may not breathe perfectly through your nose, but in most circumstances it will be a lot better.  You may still need other medical treatment for other conditions such as allergy.  Turbinate surgery does not cure allergies.    Alternatives to Turbinate Surgery:  You should have received treatment with all reasonable medical options before considering turbinate surgery.  If your nasal blockage does not bother you much, there is the option to just live with the problem.    Risks from Turbinate  Surgery:  Any surgery has some amount of risk associated with it.  Your health care provider will discuss these risks in detail with you.  This list does not include every single side effect that could possibly occur.    Uncommon  Nosebleeds in the first few days after surgery  Failure to relieve the blockage or recurrence of the blockage  Rare  Dryness and crusting of the nose    Can all of these surgeries be done at once?  Yes, if needed.  The sinuses, septum, and turbinates may be operated on at the same time or separately depending on the patients problem.   The doctor, physicians assistant, or nurse practitioner will explain to you which structures will be operated on in your case.     What will happen before surgery?    X-rays:  You will usually have a CT scan of your sinuses prior to sinus surgery; however, you may not if you are just having turbinate or septal surgery.  If you have had sinus surgery before, have bad polyps, or have a complicated case, you will usually have a special CT scan done that allows us to do the surgery with special x-ray guidance in the operating room.  This is called image-guided surgery.  It is a lot like having a GPS map in a car or on a boat to help guide you.  It helps us tell how to get around in your sinuses better.  It may make your surgery safer and allow us to do a more complete surgery in difficult cases.    Surgery Date:  Our surgery scheduler will work with you to find a convenient date for your surgery.  When you call our clinic the day before surgery, you will be told where and when to arrive for surgery and given any instructions such as to when to stop eating or drinking.    Pre-operative Evaluation:  You will be sent to the anesthesiology preop clinic or contacted by phone to be evaluated by the anesthesiology team prior to your surgery date.    Medications:  When at all possible, we will give you prescriptions for all medications you will need following surgery  so you can get them filled before your surgery date if you wish.    Anesthetic:  The surgery is done in most cases with the patient completely asleep (general anesthesia).      Surgery Location and Setting:  The surgery is usually done as an outpatient such that the patient does not have to spend the night in the hospital.  The patient can usually go home a few hours after surgery unless the patient has other medical problems that require staying in the hospital overnight.  We do surgery at the [facility].    After Surgery:  You will be given a postoperative instruction sheet.  It is important that you follow these instructions closely.  You will be given an appointment for your first postoperative visit either at the time we schedule your surgery or before you leave the hospital.  It will usually be two weeks after surgery.   It is important that you keep this appointment.  Packing is not used in the nose unless there is more bleeding than usual.  Instead a powder or absorbable sponge is used in the nose that does not have to be removed.  This material is slowly washed out when you do your nasal irrigations  Generally, the surgery is not very painful.  However, the patient may be uncomfortable due to nasal stuffiness like they might have with a bad cold.  However, pain medicine will be provided just in case.  The patient may feel nauseated or groggy the first day after surgery.  The patient should be able to get around the house alone the first day after surgery.  It is advisable to have someone stay with the patient the night of surgery.  If you must travel a long way to go home, you may want to consider staying in the area for one night so the patient does not have such a long trip.  Most patients can go back to work with light activity in 5 days or less.  Patients with more strenuous jobs that might require heavy lifting or straining should wait about 10 days to go back.    Things to  Call Us About After Surgery:  High fever  Excessive or persistent pain  Constant drip of clear fluid from your nose  Persistent nosebleeds  Any trouble with your vision or eye movement    For Questions or Emergency Care:    Call the office at [phone]. You may need to speak with the doctor on-call.      Steroid Nasal Irrigation      Steroid nasal irrigation is generally used to maximize the health of the sinuses in persons with a history of chronic sinus problems when they do not respond to typical medical treatment or after your sinus surgery to help aid in healing of the sinus cavities.  Your doctor will give you a prescription for the steroid solution that is best for your symptoms.    Instructions:    1.  A prescription from a specialized compounding pharmacy will be sent for saline and steroid irrigations which greatly help in your recovery from sinus surgery by reducing swelling and cleaning he nasal cavity and sinuses     5.  Fill the bottle or syringe with the entire steroid mixture and salt packet per instructions from the pharmacy.     6.  Insert either a squeeze bottle (preferred), a baby bulb syringe, or a water        pik attachment gently into your nose an inch or less.      7.  Lean your head over a sink.     8.  Wash out your nose with half of the mixture on one side and the other half             on the other side.     9.  Perform nasal irrigation once or twice a day as instructed as long as directed.     10.  If you use regular nasal saline (salt water) irrigations also, you should           always use the steroid mixture after the regular saline rinse.      11.  Change out the bottle, bulb, or syringe every two weeks.        For Questions or Emergency Care:  Call the office at [phone]. You may need to speak with the doctor on-call.

## 2022-03-17 ENCOUNTER — ANESTHESIA (OUTPATIENT)
Dept: SURGERY | Facility: AMBULARY SURGERY CENTER | Age: 32
End: 2022-03-17
Payer: COMMERCIAL

## 2022-03-17 ENCOUNTER — HOSPITAL ENCOUNTER (OUTPATIENT)
Facility: AMBULARY SURGERY CENTER | Age: 32
Discharge: HOME | End: 2022-03-17
Attending: OTOLARYNGOLOGY | Admitting: OTOLARYNGOLOGY
Payer: COMMERCIAL

## 2022-03-17 DIAGNOSIS — J32.9 CHRONIC SINUS INFECTION: ICD-10-CM

## 2022-03-17 PROCEDURE — 88305 TISSUE EXAM BY PATHOLOGIST: CPT | Performed by: PATHOLOGY

## 2022-03-17 PROCEDURE — 30930 THER FX NASAL INF TURBINATE: CPT | Mod: RT | Performed by: OTOLARYNGOLOGY

## 2022-03-17 PROCEDURE — D9220A PRA ANESTHESIA: Mod: CRNA,,, | Performed by: NURSE ANESTHETIST, CERTIFIED REGISTERED

## 2022-03-17 PROCEDURE — 31276 NSL/SINS NDSC FRNT TISS RMVL: CPT | Performed by: OTOLARYNGOLOGY

## 2022-03-17 PROCEDURE — 30140 RESECT INFERIOR TURBINATE: CPT | Performed by: OTOLARYNGOLOGY

## 2022-03-17 PROCEDURE — 31267 ENDOSCOPY MAXILLARY SINUS: CPT | Mod: LT | Performed by: OTOLARYNGOLOGY

## 2022-03-17 PROCEDURE — 30130 EXCISE INFERIOR TURBINATE: CPT | Mod: LT | Performed by: OTOLARYNGOLOGY

## 2022-03-17 PROCEDURE — D9220A PRA ANESTHESIA: ICD-10-PCS | Mod: CRNA,,, | Performed by: NURSE ANESTHETIST, CERTIFIED REGISTERED

## 2022-03-17 PROCEDURE — 61782 SCAN PROC CRANIAL EXTRA: CPT | Performed by: OTOLARYNGOLOGY

## 2022-03-17 PROCEDURE — 88305 TISSUE EXAM BY PATHOLOGIST: ICD-10-PCS | Mod: 26,,, | Performed by: PATHOLOGY

## 2022-03-17 PROCEDURE — 31259 NSL/SINS NDSC SPHN TISS RMVL: CPT | Mod: LT | Performed by: OTOLARYNGOLOGY

## 2022-03-17 PROCEDURE — 88305 TISSUE EXAM BY PATHOLOGIST: CPT | Mod: 26,,, | Performed by: PATHOLOGY

## 2022-03-17 PROCEDURE — D9220A PRA ANESTHESIA: Mod: ANES,,, | Performed by: ANESTHESIOLOGY

## 2022-03-17 PROCEDURE — D9220A PRA ANESTHESIA: ICD-10-PCS | Mod: ANES,,, | Performed by: ANESTHESIOLOGY

## 2022-03-17 PROCEDURE — 30520 REPAIR OF NASAL SEPTUM: CPT | Performed by: OTOLARYNGOLOGY

## 2022-03-17 PROCEDURE — 30140 RESECT INFERIOR TURBINATE: CPT

## 2022-03-17 PROCEDURE — 31253 NSL/SINS NDSC TOTAL: CPT | Performed by: OTOLARYNGOLOGY

## 2022-03-17 RX ORDER — TRANEXAMIC ACID 100 MG/ML
INJECTION, SOLUTION INTRAVENOUS
Status: DISCONTINUED | OUTPATIENT
Start: 2022-03-17 | End: 2022-03-17

## 2022-03-17 RX ORDER — LIDOCAINE HYDROCHLORIDE 10 MG/ML
1 INJECTION, SOLUTION EPIDURAL; INFILTRATION; INTRACAUDAL; PERINEURAL ONCE
Status: COMPLETED | OUTPATIENT
Start: 2022-03-17 | End: 2022-03-17

## 2022-03-17 RX ORDER — DEXAMETHASONE SODIUM PHOSPHATE 4 MG/ML
INJECTION, SOLUTION INTRA-ARTICULAR; INTRALESIONAL; INTRAMUSCULAR; INTRAVENOUS; SOFT TISSUE
Status: DISCONTINUED | OUTPATIENT
Start: 2022-03-17 | End: 2022-03-17

## 2022-03-17 RX ORDER — LIDOCAINE HYDROCHLORIDE AND EPINEPHRINE 10; 10 MG/ML; UG/ML
INJECTION, SOLUTION INFILTRATION; PERINEURAL
Status: DISCONTINUED | OUTPATIENT
Start: 2022-03-17 | End: 2022-03-17 | Stop reason: HOSPADM

## 2022-03-17 RX ORDER — SODIUM CHLORIDE, SODIUM LACTATE, POTASSIUM CHLORIDE, CALCIUM CHLORIDE 600; 310; 30; 20 MG/100ML; MG/100ML; MG/100ML; MG/100ML
INJECTION, SOLUTION INTRAVENOUS CONTINUOUS
Status: DISCONTINUED | OUTPATIENT
Start: 2022-03-17 | End: 2022-03-17 | Stop reason: HOSPADM

## 2022-03-17 RX ORDER — TRANEXAMIC ACID 100 MG/ML
INJECTION, SOLUTION INTRAVENOUS
Status: DISCONTINUED
Start: 2022-03-17 | End: 2022-03-17 | Stop reason: HOSPADM

## 2022-03-17 RX ORDER — CEFAZOLIN SODIUM 1 G/3ML
INJECTION, POWDER, FOR SOLUTION INTRAMUSCULAR; INTRAVENOUS
Status: DISCONTINUED | OUTPATIENT
Start: 2022-03-17 | End: 2022-03-17

## 2022-03-17 RX ORDER — SUCCINYLCHOLINE CHLORIDE 20 MG/ML
INJECTION INTRAMUSCULAR; INTRAVENOUS
Status: DISCONTINUED | OUTPATIENT
Start: 2022-03-17 | End: 2022-03-17

## 2022-03-17 RX ORDER — FENTANYL CITRATE 50 UG/ML
25 INJECTION, SOLUTION INTRAMUSCULAR; INTRAVENOUS EVERY 5 MIN PRN
Status: DISCONTINUED | OUTPATIENT
Start: 2022-03-17 | End: 2022-03-17 | Stop reason: HOSPADM

## 2022-03-17 RX ORDER — MIDAZOLAM HYDROCHLORIDE 1 MG/ML
INJECTION INTRAMUSCULAR; INTRAVENOUS
Status: DISCONTINUED | OUTPATIENT
Start: 2022-03-17 | End: 2022-03-17

## 2022-03-17 RX ORDER — DEXMEDETOMIDINE HYDROCHLORIDE 100 UG/ML
INJECTION, SOLUTION INTRAVENOUS
Status: DISCONTINUED | OUTPATIENT
Start: 2022-03-17 | End: 2022-03-17

## 2022-03-17 RX ORDER — PROPOFOL 10 MG/ML
VIAL (ML) INTRAVENOUS
Status: DISCONTINUED | OUTPATIENT
Start: 2022-03-17 | End: 2022-03-17

## 2022-03-17 RX ORDER — FENTANYL CITRATE 50 UG/ML
INJECTION, SOLUTION INTRAMUSCULAR; INTRAVENOUS
Status: DISCONTINUED | OUTPATIENT
Start: 2022-03-17 | End: 2022-03-17

## 2022-03-17 RX ORDER — OXYMETAZOLINE HCL 0.05 %
1 SPRAY, NON-AEROSOL (ML) NASAL
Status: COMPLETED | OUTPATIENT
Start: 2022-03-17 | End: 2022-03-17

## 2022-03-17 RX ORDER — HYDROCODONE BITARTRATE AND ACETAMINOPHEN 5; 325 MG/1; MG/1
1 TABLET ORAL EVERY 4 HOURS PRN
Status: DISCONTINUED | OUTPATIENT
Start: 2022-03-17 | End: 2022-03-17 | Stop reason: HOSPADM

## 2022-03-17 RX ORDER — EPINEPHRINE CONVENIENCE KIT 1 MG/ML(1)
KIT INTRAMUSCULAR; SUBCUTANEOUS
Status: DISCONTINUED | OUTPATIENT
Start: 2022-03-17 | End: 2022-03-17 | Stop reason: HOSPADM

## 2022-03-17 RX ORDER — ONDANSETRON HYDROCHLORIDE 2 MG/ML
INJECTION, SOLUTION INTRAMUSCULAR; INTRAVENOUS
Status: DISCONTINUED | OUTPATIENT
Start: 2022-03-17 | End: 2022-03-17

## 2022-03-17 RX ORDER — LIDOCAINE HCL/PF 100 MG/5ML
SYRINGE (ML) INTRAVENOUS
Status: DISCONTINUED | OUTPATIENT
Start: 2022-03-17 | End: 2022-03-17

## 2022-03-17 RX ORDER — ACETAMINOPHEN 10 MG/ML
INJECTION, SOLUTION INTRAVENOUS
Status: DISCONTINUED | OUTPATIENT
Start: 2022-03-17 | End: 2022-03-17

## 2022-03-17 RX ORDER — OXYCODONE HYDROCHLORIDE 5 MG/1
5 TABLET ORAL ONCE AS NEEDED
Status: COMPLETED | OUTPATIENT
Start: 2022-03-17 | End: 2022-03-17

## 2022-03-17 RX ORDER — ROCURONIUM BROMIDE 10 MG/ML
INJECTION, SOLUTION INTRAVENOUS
Status: DISCONTINUED | OUTPATIENT
Start: 2022-03-17 | End: 2022-03-17

## 2022-03-17 RX ADMIN — Medication 100 MG: at 09:03

## 2022-03-17 RX ADMIN — ROCURONIUM BROMIDE 10 MG: 10 INJECTION, SOLUTION INTRAVENOUS at 09:03

## 2022-03-17 RX ADMIN — CEFAZOLIN SODIUM 2 G: 1 INJECTION, POWDER, FOR SOLUTION INTRAMUSCULAR; INTRAVENOUS at 09:03

## 2022-03-17 RX ADMIN — OXYCODONE HYDROCHLORIDE 5 MG: 5 TABLET ORAL at 12:03

## 2022-03-17 RX ADMIN — LIDOCAINE HYDROCHLORIDE 2 MG: 10 INJECTION, SOLUTION EPIDURAL; INFILTRATION; INTRACAUDAL; PERINEURAL at 08:03

## 2022-03-17 RX ADMIN — Medication 250 MG: at 09:03

## 2022-03-17 RX ADMIN — TRANEXAMIC ACID 1000 MG: 100 INJECTION, SOLUTION INTRAVENOUS at 10:03

## 2022-03-17 RX ADMIN — DEXMEDETOMIDINE HYDROCHLORIDE 10 MCG: 100 INJECTION, SOLUTION INTRAVENOUS at 10:03

## 2022-03-17 RX ADMIN — ROCURONIUM BROMIDE 30 MG: 10 INJECTION, SOLUTION INTRAVENOUS at 09:03

## 2022-03-17 RX ADMIN — Medication 1 SPRAY: at 08:03

## 2022-03-17 RX ADMIN — DEXMEDETOMIDINE HYDROCHLORIDE 10 MCG: 100 INJECTION, SOLUTION INTRAVENOUS at 09:03

## 2022-03-17 RX ADMIN — FENTANYL CITRATE 25 MCG: 50 INJECTION, SOLUTION INTRAMUSCULAR; INTRAVENOUS at 12:03

## 2022-03-17 RX ADMIN — ACETAMINOPHEN 1000 MG: 10 INJECTION, SOLUTION INTRAVENOUS at 09:03

## 2022-03-17 RX ADMIN — FENTANYL CITRATE 25 MCG: 50 INJECTION, SOLUTION INTRAMUSCULAR; INTRAVENOUS at 11:03

## 2022-03-17 RX ADMIN — SODIUM CHLORIDE, SODIUM LACTATE, POTASSIUM CHLORIDE, CALCIUM CHLORIDE: 600; 310; 30; 20 INJECTION, SOLUTION INTRAVENOUS at 08:03

## 2022-03-17 RX ADMIN — ONDANSETRON HYDROCHLORIDE 4 MG: 2 INJECTION, SOLUTION INTRAMUSCULAR; INTRAVENOUS at 09:03

## 2022-03-17 RX ADMIN — FENTANYL CITRATE 100 MCG: 50 INJECTION, SOLUTION INTRAMUSCULAR; INTRAVENOUS at 09:03

## 2022-03-17 RX ADMIN — DEXAMETHASONE SODIUM PHOSPHATE 12 MG: 4 INJECTION, SOLUTION INTRA-ARTICULAR; INTRALESIONAL; INTRAMUSCULAR; INTRAVENOUS; SOFT TISSUE at 09:03

## 2022-03-17 RX ADMIN — SUCCINYLCHOLINE CHLORIDE 120 MG: 20 INJECTION INTRAMUSCULAR; INTRAVENOUS at 09:03

## 2022-03-17 RX ADMIN — MIDAZOLAM HYDROCHLORIDE 2 MG: 1 INJECTION INTRAMUSCULAR; INTRAVENOUS at 08:03

## 2022-03-17 RX ADMIN — SODIUM CHLORIDE, SODIUM LACTATE, POTASSIUM CHLORIDE, CALCIUM CHLORIDE: 600; 310; 30; 20 INJECTION, SOLUTION INTRAVENOUS at 09:03

## 2022-03-17 NOTE — ANESTHESIA PROCEDURE NOTES
Intubation    Date/Time: 3/17/2022 9:08 AM  Performed by: Ovidio Terrazas CRNA  Authorized by: Cuco Moore MD     Intubation:     Induction:  Intravenous    Intubated:  Postinduction    Mask Ventilation:  Easy with oral airway    Attempts:  1    Attempted By:  CRNA    Method of Intubation:  Direct    Blade:  Villareal 2    Laryngeal View Grade: Grade IIA - cords partially seen      Difficult Airway Encountered?: No      Complications:  None    Airway Device:  Oral endotracheal tube    Airway Device Size:  7.5    Style/Cuff Inflation:  Cuffed (inflated to minimal occlusive pressure)    Placement Verified By:  Capnometry    Complicating Factors:  Small mouth (long facial hair)    Findings Post-Intubation:  BS equal bilateral and atraumatic/condition of teeth unchanged

## 2022-03-17 NOTE — TRANSFER OF CARE
"Anesthesia Transfer of Care Note    Patient: Allan Donahue    Procedure(s) Performed: Procedure(s) (LRB):  EXCISION, NASAL TURBINATE (Bilateral)  SEPTOPLASTY, NOSE (N/A)  FESS, USING COMPUTER-ASSISTED NAVIGATION Disc downloaded (N/A)    Patient location: PACU    Anesthesia Type: general    Transport from OR: Transported from OR on 100% O2 by closed face mask with adequate spontaneous ventilation    Post pain: adequate analgesia    Post assessment: no apparent anesthetic complications and tolerated procedure well    Post vital signs: stable    Level of consciousness: sedated and responds to stimulation    Nausea/Vomiting: no nausea/vomiting    Complications: none    Transfer of care protocol was followed      Last vitals:   Visit Vitals  BP (!) 110/56   Pulse 90   Temp 36.6 °C (97.9 °F) (Skin)   Resp 18   Ht 5' 10" (1.778 m)   Wt 121.1 kg (267 lb)   SpO2 (!) 93%   BMI 38.31 kg/m²     "

## 2022-03-17 NOTE — ANESTHESIA POSTPROCEDURE EVALUATION
Anesthesia Post Evaluation    Patient: Allan Donahue    Procedure(s) Performed: Procedure(s) (LRB):  EXCISION, NASAL TURBINATE (Bilateral)  SEPTOPLASTY, NOSE (N/A)  FESS, USING COMPUTER-ASSISTED NAVIGATION Disc downloaded (N/A)    Final Anesthesia Type: general      Patient location during evaluation: PACU  Patient participation: Yes- Able to Participate  Level of consciousness: awake and alert  Post-procedure vital signs: reviewed and stable  Pain management: adequate  Airway patency: patent    PONV status at discharge: No PONV  Anesthetic complications: no      Cardiovascular status: hemodynamically stable  Respiratory status: unassisted and room air  Hydration status: euvolemic  Follow-up not needed.          Vitals Value Taken Time   /64 03/17/22 1249   Temp 36.6 °C (97.9 °F) 03/17/22 1056   Pulse 83 03/17/22 1250   Resp 20 03/17/22 1225   SpO2 95 % 03/17/22 1250   Vitals shown include unvalidated device data.      No case tracking events are documented in the log.      Pain/Munir Score: Pain Rating Prior to Med Admin: 6 (3/17/2022 12:25 PM)  Modified Munir Score: 19 (3/17/2022 12:20 PM)

## 2022-03-17 NOTE — PLAN OF CARE
Discharge instructions given to pt/girlfriend, verbalized understanding.  Fentanyl and oxycodone given as ordered for pain, still has c/o headache 4/10.   No nausea noted.  IV removed.  Tolerating PO fluids.  Mustache drsg dry and intact, extra gauze/tape provided; instructing pt to expect drainage and to change drsg as needed.  Wheeled out to gf  per RN in no distress.

## 2022-03-17 NOTE — OP NOTE
ENT OPERATIVE NOTE    DATE OF SURGERY: 03/17/2022    ATTENDING SURGEON:  Jacob Velasquez MD    ANESTHESIA:  General endotracheal anesthesia.    PREOPERATIVE DIAGNOSIS:   1. Acute recurrent pan sinusitis   2. Chronic bilateral maxillary sinusitis.   3. Acute recurrent bilateral frontal sinusitis.   4. Chronic bilateral ethmoid sinusitis.   5. Chronic bilateral sphenoid sinusitis.  6. Bilateral turbinate hypertrophy  7. Nasal septal deviation    POSTOPERATIVE DIAGNOSIS:   Same     PROCEDURE:   1. Endoscopic bilateral maxillary sinusotomy with removal of contents bilaterally  2. Endoscopic bilateral frontal sinusotomy.  3. Endoscopic bilateral total ethmoidectomy.   4. Endoscopic bilateral sphenoidotomy  with removal of contents bilaterally  5. Use of image guidance.  6. Bilateral partial resection of inferior turbinates  7. Nasal septoplasty    INDICATIONS:  This is a 31 y.o. male with a history of the above diagnosis.  The CT scan revealed pan sinusitis.  Recommendations were made for sinus surgery.  The risks, benefits, and alternatives to sinus surgery were discussed with the patient who wished to proceed.    OPERATIVE FINDINGS:  - Large left posterior septal deviation    ANESTHETIC AND POSITIONING:  The patient was taken to the operating room and placed in a supine position where general endotracheal anesthesia was established without difficulty.  The patient was placed in a beach chair position.  The eyes were taped shut laterally so that the medial eye could be viewed as needed and the patient was adequately padded.    IMAGE GUIDANCE:  A preoperative image guidance compatible scan was obtained due to the indication of frontal and sphenoid surgery and was used to plan the surgery.   The system was registered and verified with an acceptable degree of accuracy.  The system was used for navigation throughout the case.    NASAL CAVITY PREPARATION:  Decongestion:  Epinephrine 1:2000 applied topically on pledgets.  OR   Oxymetazoline 0.05% applied topically on pledgets.  Injection:   Lidocaine 1% with epinephrine 1:100,000 was injected in the operative field for hemostasis.    NASAL ENDOSCOPY:  The nasopharynx, inferior nasal cavity, middle meatus, olfactory region, and sphenoethmoidal recess were examined bilaterally with nasal endoscopy.  The remainder of the case was performed via endoscopes working off a video monitor.   Findings:   - Inflamed mucosa, large left posterior deviation    DESCRIPTION OF PROCEDURE:    Maxillary Sinusotomy:  Right:  Uncinate removed.  Primary ostium cannulated and opened widely into the posterior fontanelle.    Findings:  Inflamed mucosa, no polyps or purulence  Left:  Uncinate removed.  Primary ostium cannulated and opened widely into the posterior fontanelle.   Findings: Inflamed mucosa, no polyps or purulence    Anterior Ethmoidectomy:  Right: Bulla entered in inferomedial fashion with the anterior face removed.  Remaining septae removed to open the anterior ethmoid cavity.  Findings: Inflamed mucosa, no polyps or purulence  Left: Bulla entered in inferomedial fashion with the anterior face removed.  Remaining septae removed to open the anterior ethmoid cavity.  Findings: Inflamed mucosa, no polyps or purulence      Posterior Ethmoidectomy:  Right:  The basal lamella was opened in the inferiomedial aspect to enter the posterior ethmoid cells.  The remainder of the septae were removed to open the posterior ethmoid cavity.  Findings: Inflamed mucosa, no polyps or purulence   Left:  The basal lamella was opened in the inferomedial aspect to enter the posterior ethmoid cells.  The remainder of the septae were removed to open the posterior ethmoid cavity.  Findings: Inflamed mucosa, no polyps or purulence     Frontal Sinusotomy Draf 1a:  Right:  An image guided probe was passed into the frontal sinus and used to bluntly open the outflow tract.   Bony septae and soft tissue were removed to complete a  wide frontal sinusotomy.  Findings:  Inflamed mucosa, no polyps or purulence  Left:  An image guided probe was passed into the frontal sinus and used to bluntly open the outflow tract.   Bony septae and soft tissue were removed to complete a wide frontal sinusotomy.  Findings: Inflamed mucosa, no polyps or purulence     Sphenoid Sinusotomy:  Right: Cannulated with image guided probe and bluntly widened.  Curette and rongeurs used to open widely in an inferomedial direction.  Findings: Inflamed mucosa, no polyps or purulence  Left:  Cannulated with image guided probe and bluntly widened.  Curette and rongeurs used to open widely in an inferomedial direction.  Findings: Inflamed mucosa, no polyps or purulence    Inferior Turbinate Resection:  Right:  The turbinate was medialized. The microdebrider turbinate blade was used to submucosally remove the turbinate tissue along with partial excision of the bone and inferior mucosa. The turbinate was then outfractured with the Russo elevator  Findings: Hypertrophied inferior turbinate.  Left: The turbinate was medialized. The microdebrider turbinate blade was used to submucosally remove the turbinate tissue along with partial excision of the bone and inferior mucosa. The turbinate was then outfractured with the Russo elevator  Findings: Hypertrophied inferior turbinate.    Septoplasty:  A left hemitransfixion incision was performed.  The mucoperichondrial flap was elevated past the bony cartilaginous junction. Taking care to preserve an adequate dorsal caudal strut, caudal and dorsal cartilaginous incisions were made taking care to preserve the contralateral mucoperichondrial flap. The deviated portion of the cartilaginous and bony septum was removed.  The hemitransfixion incision was closed with 5-0 fast gut simple, interrupted sutures.  The septal flaps were coapted with a 4-0 plain gut suture on a Yariel needle.  Findings: large left posterior deviation    STENTS  PLACED:  None    PACKING PLACED:  Right: Nasopore in ethmoid cavity with 1cc 50:50 mixture of Kenalog   Indications: prevent scaring, deliver medication  Left: Nasopore in ethmoid cavity with 1cc 50:50 mixture of Kenalog   Indications: prevent scaring, deliver medication    HEMOSTASIS:  Epinephrine-soaked pledgets which were removed.    Nasapore applied    PROCEDURE TERMINATION:  Counts correct.  Eyes checked for bruising.  Stomach suctioned with orogastric tube.  Face cleaned.      COMPLICATIONS:  None    EBL:  30cc    SPECIMENS SENT:  Pathology: sinus contents  Cultures: none    DISPOSITION: Pt will be discharged home and f/u in one week.     Surgery Related Medications:    1. Doxycycline  2. norco  3. zofran

## 2022-03-17 NOTE — ANESTHESIA PREPROCEDURE EVALUATION
03/17/2022  Allan Donahue is a 31 y.o., male.      Pre-op Assessment    I have reviewed the Patient Summary Reports.     I have reviewed the Nursing Notes. I have reviewed the NPO Status.   I have reviewed the Medications.     Review of Systems  Anesthesia Hx:  No problems with previous Anesthesia    Social:  Former Smoker    EENT/Dental:   Chronic sinusitis   Pulmonary:   Sleep Apnea    Hepatic/GI:   GERD    Endocrine:  Obesity / BMI > 30  Psych:   Psychiatric History          Physical Exam  General: Well nourished, Cooperative, Alert and Oriented    Airway:  Mallampati: II   Mouth Opening: Normal  TM Distance: Normal  Tongue: Normal  Neck ROM: Normal ROM  Neck: Girth Increased    Dental:  Intact    Chest/Lungs:  Clear to auscultation, Normal Respiratory Rate    Heart:  Rate: Normal  Rhythm: Regular Rhythm  Sounds: Normal        Anesthesia Plan  Type of Anesthesia, risks & benefits discussed:    Anesthesia Type: Gen ETT  Intra-op Monitoring Plan: Standard ASA Monitors  Post Op Pain Control Plan: multimodal analgesia and IV/PO Opioids PRN  Airway Plan: Direct, Post-Induction  Informed Consent: Informed consent signed with the Patient and all parties understand the risks and agree with anesthesia plan.  All questions answered.   ASA Score: 2  Day of Surgery Review of History & Physical: H&P Update referred to the surgeon/provider.    Ready For Surgery From Anesthesia Perspective.     .

## 2022-03-18 VITALS
DIASTOLIC BLOOD PRESSURE: 66 MMHG | HEIGHT: 70 IN | OXYGEN SATURATION: 94 % | RESPIRATION RATE: 20 BRPM | BODY MASS INDEX: 38.22 KG/M2 | SYSTOLIC BLOOD PRESSURE: 129 MMHG | WEIGHT: 267 LBS | HEART RATE: 77 BPM | TEMPERATURE: 98 F

## 2022-03-29 LAB
FINAL PATHOLOGIC DIAGNOSIS: NORMAL
GROSS: NORMAL
Lab: NORMAL

## 2022-05-01 ENCOUNTER — HOSPITAL ENCOUNTER (EMERGENCY)
Facility: HOSPITAL | Age: 32
Discharge: HOME | End: 2022-05-02
Attending: EMERGENCY MEDICINE
Payer: COMMERCIAL

## 2022-05-01 DIAGNOSIS — T78.40XA ALLERGIC REACTION, INITIAL ENCOUNTER: Primary | ICD-10-CM

## 2022-05-01 PROCEDURE — 96375 TX/PRO/DX INJ NEW DRUG ADDON: CPT

## 2022-05-01 PROCEDURE — 99284 EMERGENCY DEPT VISIT MOD MDM: CPT | Mod: 25

## 2022-05-01 PROCEDURE — 63600175 PHARM REV CODE 636 W HCPCS: Performed by: EMERGENCY MEDICINE

## 2022-05-01 PROCEDURE — 96374 THER/PROPH/DIAG INJ IV PUSH: CPT

## 2022-05-01 PROCEDURE — 25000003 PHARM REV CODE 250: Performed by: EMERGENCY MEDICINE

## 2022-05-01 RX ORDER — FAMOTIDINE 10 MG/ML
20 INJECTION INTRAVENOUS
Status: COMPLETED | OUTPATIENT
Start: 2022-05-01 | End: 2022-05-01

## 2022-05-01 RX ORDER — METHYLPREDNISOLONE SOD SUCC 125 MG
125 VIAL (EA) INJECTION
Status: COMPLETED | OUTPATIENT
Start: 2022-05-01 | End: 2022-05-01

## 2022-05-01 RX ORDER — DIPHENHYDRAMINE HYDROCHLORIDE 50 MG/ML
25 INJECTION INTRAMUSCULAR; INTRAVENOUS
Status: COMPLETED | OUTPATIENT
Start: 2022-05-01 | End: 2022-05-01

## 2022-05-01 RX ADMIN — DIPHENHYDRAMINE HYDROCHLORIDE 25 MG: 50 INJECTION, SOLUTION INTRAMUSCULAR; INTRAVENOUS at 10:05

## 2022-05-01 RX ADMIN — METHYLPREDNISOLONE SODIUM SUCCINATE 125 MG: 125 INJECTION, POWDER, FOR SOLUTION INTRAMUSCULAR; INTRAVENOUS at 10:05

## 2022-05-01 RX ADMIN — FAMOTIDINE 20 MG: 10 INJECTION INTRAVENOUS at 10:05

## 2022-05-02 VITALS
HEART RATE: 88 BPM | OXYGEN SATURATION: 95 % | TEMPERATURE: 98 F | BODY MASS INDEX: 35.87 KG/M2 | RESPIRATION RATE: 20 BRPM | DIASTOLIC BLOOD PRESSURE: 82 MMHG | WEIGHT: 250 LBS | SYSTOLIC BLOOD PRESSURE: 144 MMHG

## 2022-05-02 RX ORDER — PREDNISONE 20 MG/1
40 TABLET ORAL DAILY
Qty: 10 TABLET | Refills: 0 | Status: SHIPPED | OUTPATIENT
Start: 2022-05-02 | End: 2022-05-07

## 2022-05-02 NOTE — ED PROVIDER NOTES
"Encounter Date: 5/1/2022       History     Chief Complaint   Patient presents with    Allergic Reaction     Pt was cutting "brush" outside Friday night.  Pt woke up Saturday morning with swollen eyes and "foreskin".  Pt hasn't taken benadryl but took equate allergy relief.     Patient here reported swelling to his facial area and in his groin after working outside on Friday since he woke was Saturday morning with swelling of groin no significant itching now swelling in the face that is worsened today he denies any shortness of breath no difficulty swallowing he denies any previous allergic reactions he was taking equate allergic over last 2 days but has not had any medicine since 3 patient's only new medication was Vyvanse bees been taking this for over week without reaction        Review of patient's allergies indicates:  No Known Allergies  Past Medical History:   Diagnosis Date    ADD (attention deficit disorder)     Class 2 obesity without serious comorbidity with body mass index (BMI) of 37.0 to 37.9 in adult 2/19/2018    GERD (gastroesophageal reflux disease)     HARRIS (obstructive sleep apnea)      Past Surgical History:   Procedure Laterality Date    APPENDECTOMY      FUNCTIONAL ENDOSCOPIC SINUS SURGERY (FESS) USING COMPUTER-ASSISTED NAVIGATION N/A 3/17/2022    Procedure: FESS, USING COMPUTER-ASSISTED NAVIGATION Disc downloaded;  Surgeon: Jacob Velasquez MD;  Location: [facility] OR;  Service: ENT;  Laterality: N/A;    NASAL SEPTOPLASTY N/A 3/17/2022    Procedure: SEPTOPLASTY, NOSE;  Surgeon: Jacob Velasquez MD;  Location: [facility] OR;  Service: ENT;  Laterality: N/A;    SURGICAL REMOVAL OF NASAL TURBINATE Bilateral 3/17/2022    Procedure: EXCISION, NASAL TURBINATE;  Surgeon: Jacob Velasquez MD;  Location: [facility] OR;  Service: ENT;  Laterality: Bilateral;    WISDOM TOOTH EXTRACTION       Family History   Problem Relation Age of Onset    DIANA disease Father         severe    Hypertension Brother  "     Social History     Tobacco Use    Smoking status: Former Smoker    Smokeless tobacco: Former User    Tobacco comment: about 2 years and quit 2007   Substance Use Topics    Alcohol use: Yes     Comment: rarely    Drug use: No     Review of Systems   HENT: Positive for facial swelling. Negative for trouble swallowing.    Respiratory: Negative for shortness of breath.    Skin: Positive for rash.       Physical Exam     Initial Vitals [05/01/22 2152]   BP Pulse Resp Temp SpO2   (!) 141/101 106 20 98.3 °F (36.8 °C) 97 %      MAP       --         Physical Exam    Constitutional: He appears well-developed and well-nourished.   HENT:   Head: Normocephalic and atraumatic.   Right Ear: External ear normal.   Left Ear: External ear normal.   Mouth/Throat: Oropharynx is clear and moist.   Swelling to the face eyes forehead periorbital regions   Eyes: Conjunctivae and EOM are normal. Pupils are equal, round, and reactive to light.   Cardiovascular: Normal rate, regular rhythm and normal heart sounds.   Pulmonary/Chest: No respiratory distress. He has no wheezes.   Genitourinary:    Genitourinary Comments: Swelling noted to the shaft of the penis     Musculoskeletal:         General: Normal range of motion.     Lymphadenopathy:     He has no cervical adenopathy.   Neurological: He is alert and oriented to person, place, and time. GCS score is 15. GCS eye subscore is 4. GCS verbal subscore is 5. GCS motor subscore is 6.   Skin: Rash noted. There is erythema.         ED Course   Procedures  Labs Reviewed - No data to display       Imaging Results    None          Medications   methylPREDNISolone sodium succinate injection 125 mg (125 mg Intravenous Given 5/1/22 2253)   diphenhydrAMINE injection 25 mg (25 mg Intravenous Given 5/1/22 2252)   famotidine (PF) injection 20 mg (20 mg Intravenous Given 5/1/22 2253)     Medical Decision Making:   ED Management:  Patient's erythema and swelling are improving will release with  recommendations to continue Benadryl an ex 24 hours and final prescription for prednisone outpatient follow-up return to ER for any worsened symptoms                      Clinical Impression:   Final diagnoses:  [T78.40XA] Allergic reaction, initial encounter (Primary)          ED Disposition Condition    Discharge Stable        ED Prescriptions     Medication Sig Dispense Start Date End Date Auth. Provider    predniSONE (DELTASONE) 20 MG tablet Take 2 tablets (40 mg total) by mouth once daily. for 5 days 10 tablet 5/2/2022 5/7/2022 Enmanuel Zhao MD        Follow-up Information     Follow up With Specialties Details Why Contact Info    [facility] Call in 1 day for re-examination of your symptoms [address] [facility] [address]  [phone]             Enmanuel Zhao MD  05/02/22 0006

## 2022-06-28 ENCOUNTER — HOSPITAL ENCOUNTER (EMERGENCY)
Facility: HOSPITAL | Age: 32
Discharge: HOME | End: 2022-06-28
Attending: EMERGENCY MEDICINE
Payer: COMMERCIAL

## 2022-06-28 VITALS
OXYGEN SATURATION: 100 % | WEIGHT: 260 LBS | RESPIRATION RATE: 16 BRPM | DIASTOLIC BLOOD PRESSURE: 80 MMHG | TEMPERATURE: 98 F | HEART RATE: 88 BPM | BODY MASS INDEX: 37.31 KG/M2 | SYSTOLIC BLOOD PRESSURE: 130 MMHG

## 2022-06-28 DIAGNOSIS — K52.9 COLITIS: Primary | ICD-10-CM

## 2022-06-28 LAB
ALBUMIN SERPL BCP-MCNC: 4.8 G/DL (ref 3.5–5.2)
ALP SERPL-CCNC: 54 U/L (ref 55–135)
ALT SERPL W/O P-5'-P-CCNC: 30 U/L (ref 10–44)
ANION GAP SERPL CALC-SCNC: 10 MMOL/L (ref 8–16)
AST SERPL-CCNC: 23 U/L (ref 10–40)
BASOPHILS # BLD AUTO: 0.05 K/UL (ref 0–0.2)
BASOPHILS NFR BLD: 0.4 % (ref 0–1.9)
BILIRUB SERPL-MCNC: 0.9 MG/DL (ref 0.1–1)
BUN SERPL-MCNC: 15 MG/DL (ref 6–20)
CALCIUM SERPL-MCNC: 9.8 MG/DL (ref 8.7–10.5)
CHLORIDE SERPL-SCNC: 101 MMOL/L (ref 95–110)
CO2 SERPL-SCNC: 27 MMOL/L (ref 23–29)
CREAT SERPL-MCNC: 1 MG/DL (ref 0.5–1.4)
CREAT SERPL-MCNC: 1 MG/DL (ref 0.5–1.4)
DIFFERENTIAL METHOD: ABNORMAL
EOSINOPHIL # BLD AUTO: 0.1 K/UL (ref 0–0.5)
EOSINOPHIL NFR BLD: 0.6 % (ref 0–8)
ERYTHROCYTE [DISTWIDTH] IN BLOOD BY AUTOMATED COUNT: 12.2 % (ref 11.5–14.5)
EST. GFR  (AFRICAN AMERICAN): >60 ML/MIN/1.73 M^2
EST. GFR  (NON AFRICAN AMERICAN): >60 ML/MIN/1.73 M^2
GLUCOSE SERPL-MCNC: 104 MG/DL (ref 70–110)
HCT VFR BLD AUTO: 47 % (ref 40–54)
HGB BLD-MCNC: 16.1 G/DL (ref 14–18)
IMM GRANULOCYTES # BLD AUTO: 0.05 K/UL (ref 0–0.04)
IMM GRANULOCYTES NFR BLD AUTO: 0.4 % (ref 0–0.5)
LIPASE SERPL-CCNC: 30 U/L (ref 4–60)
LYMPHOCYTES # BLD AUTO: 2.1 K/UL (ref 1–4.8)
LYMPHOCYTES NFR BLD: 15.2 % (ref 18–48)
MCH RBC QN AUTO: 30.3 PG (ref 27–31)
MCHC RBC AUTO-ENTMCNC: 34.3 G/DL (ref 32–36)
MCV RBC AUTO: 88 FL (ref 82–98)
MONOCYTES # BLD AUTO: 1.1 K/UL (ref 0.3–1)
MONOCYTES NFR BLD: 7.9 % (ref 4–15)
NEUTROPHILS # BLD AUTO: 10.6 K/UL (ref 1.8–7.7)
NEUTROPHILS NFR BLD: 75.5 % (ref 38–73)
NRBC BLD-RTO: 0 /100 WBC
PLATELET # BLD AUTO: 249 K/UL (ref 150–450)
PMV BLD AUTO: 11.1 FL (ref 9.2–12.9)
POTASSIUM SERPL-SCNC: 3.8 MMOL/L (ref 3.5–5.1)
PROT SERPL-MCNC: 9 G/DL (ref 6–8.4)
RBC # BLD AUTO: 5.32 M/UL (ref 4.6–6.2)
SAMPLE: NORMAL
SODIUM SERPL-SCNC: 138 MMOL/L (ref 136–145)
WBC # BLD AUTO: 14.02 K/UL (ref 3.9–12.7)

## 2022-06-28 PROCEDURE — 25500020 PHARM REV CODE 255: Performed by: EMERGENCY MEDICINE

## 2022-06-28 PROCEDURE — 25000003 PHARM REV CODE 250: Performed by: EMERGENCY MEDICINE

## 2022-06-28 PROCEDURE — 85025 COMPLETE CBC W/AUTO DIFF WBC: CPT | Performed by: STUDENT IN AN ORGANIZED HEALTH CARE EDUCATION/TRAINING PROGRAM

## 2022-06-28 PROCEDURE — 80053 COMPREHEN METABOLIC PANEL: CPT | Performed by: STUDENT IN AN ORGANIZED HEALTH CARE EDUCATION/TRAINING PROGRAM

## 2022-06-28 PROCEDURE — 99285 EMERGENCY DEPT VISIT HI MDM: CPT | Mod: 25

## 2022-06-28 PROCEDURE — 83690 ASSAY OF LIPASE: CPT | Performed by: STUDENT IN AN ORGANIZED HEALTH CARE EDUCATION/TRAINING PROGRAM

## 2022-06-28 RX ORDER — CIPROFLOXACIN 500 MG/1
500 TABLET ORAL 2 TIMES DAILY
Qty: 20 TABLET | Refills: 0 | Status: SHIPPED | OUTPATIENT
Start: 2022-06-28 | End: 2022-07-08

## 2022-06-28 RX ORDER — METRONIDAZOLE 500 MG/1
500 TABLET ORAL 3 TIMES DAILY
Qty: 30 TABLET | Refills: 0 | Status: SHIPPED | OUTPATIENT
Start: 2022-06-28 | End: 2022-07-08

## 2022-06-28 RX ORDER — KETOROLAC TROMETHAMINE 30 MG/ML
15 INJECTION, SOLUTION INTRAMUSCULAR; INTRAVENOUS
Status: DISCONTINUED | OUTPATIENT
Start: 2022-06-28 | End: 2022-06-28

## 2022-06-28 RX ORDER — METRONIDAZOLE 250 MG/1
500 TABLET ORAL
Status: COMPLETED | OUTPATIENT
Start: 2022-06-28 | End: 2022-06-28

## 2022-06-28 RX ORDER — DICYCLOMINE HYDROCHLORIDE 10 MG/1
20 CAPSULE ORAL
Status: DISCONTINUED | OUTPATIENT
Start: 2022-06-28 | End: 2022-06-28

## 2022-06-28 RX ORDER — CIPROFLOXACIN 500 MG/1
500 TABLET ORAL
Status: COMPLETED | OUTPATIENT
Start: 2022-06-28 | End: 2022-06-28

## 2022-06-28 RX ORDER — MORPHINE SULFATE 4 MG/ML
4 INJECTION, SOLUTION INTRAMUSCULAR; INTRAVENOUS
Status: DISCONTINUED | OUTPATIENT
Start: 2022-06-28 | End: 2022-06-28

## 2022-06-28 RX ADMIN — CIPROFLOXACIN 500 MG: 500 TABLET, FILM COATED ORAL at 06:06

## 2022-06-28 RX ADMIN — METRONIDAZOLE 500 MG: 250 TABLET ORAL at 06:06

## 2022-06-28 RX ADMIN — IOHEXOL 100 ML: 350 INJECTION, SOLUTION INTRAVENOUS at 05:06

## 2022-06-28 NOTE — ED PROVIDER NOTES
Encounter Date: 6/28/2022       History     Chief Complaint   Patient presents with    Abdominal Pain     L lower abd pain since yesterday     Patient presents complaining of left-sided abdominal pain since yesterday.  Patient had no nausea vomiting.  No bloody diarrhea.  The worst symptoms are moderate.  Patient has not had this before.  Nothing really makes it better worse.        Review of patient's allergies indicates:  No Known Allergies  Past Medical History:   Diagnosis Date    ADD (attention deficit disorder)     Class 2 obesity without serious comorbidity with body mass index (BMI) of 37.0 to 37.9 in adult 2/19/2018    GERD (gastroesophageal reflux disease)     HARRIS (obstructive sleep apnea)      Past Surgical History:   Procedure Laterality Date    APPENDECTOMY      FUNCTIONAL ENDOSCOPIC SINUS SURGERY (FESS) USING COMPUTER-ASSISTED NAVIGATION N/A 3/17/2022    Procedure: FESS, USING COMPUTER-ASSISTED NAVIGATION Disc downloaded;  Surgeon: Jacob Velasquez MD;  Location: [facility] OR;  Service: ENT;  Laterality: N/A;    NASAL SEPTOPLASTY N/A 3/17/2022    Procedure: SEPTOPLASTY, NOSE;  Surgeon: Jacob Velasquez MD;  Location: [facility] OR;  Service: ENT;  Laterality: N/A;    SURGICAL REMOVAL OF NASAL TURBINATE Bilateral 3/17/2022    Procedure: EXCISION, NASAL TURBINATE;  Surgeon: Jacob Velasquez MD;  Location: [facility] OR;  Service: ENT;  Laterality: Bilateral;    WISDOM TOOTH EXTRACTION       Family History   Problem Relation Age of Onset    DIANA disease Father         severe    Hypertension Brother      Social History     Tobacco Use    Smoking status: Former Smoker    Smokeless tobacco: Former User    Tobacco comment: about 2 years and quit 2007   Substance Use Topics    Alcohol use: Yes     Comment: rarely    Drug use: No     Review of Systems   All other systems reviewed and are negative.      Physical Exam     Initial Vitals [06/28/22 1558]   BP Pulse Resp Temp SpO2   (!) 141/87 106 16 98.8 °F  (37.1 °C) 97 %      MAP       --         Physical Exam    Nursing note and vitals reviewed.  Constitutional: He appears well-developed and well-nourished. He is not diaphoretic. No distress.   Pleasant, polite.   HENT:   Head: Normocephalic and atraumatic.   Mouth/Throat: Oropharynx is clear and moist.   Eyes: EOM are normal.   Neck: Neck supple.   Normal range of motion.  Cardiovascular: Normal rate, regular rhythm, normal heart sounds and intact distal pulses.   Pulmonary/Chest: No respiratory distress.   Abdominal: There is abdominal tenderness.   Left-sided tenderness There is no rebound and no guarding.   Musculoskeletal:         General: Normal range of motion.      Cervical back: Normal range of motion and neck supple.     Neurological: He is alert and oriented to person, place, and time. He has normal strength.   Skin: Skin is warm and dry.   Psychiatric: He has a normal mood and affect. His behavior is normal. Judgment and thought content normal.         ED Course   Procedures  Labs Reviewed   CBC W/ AUTO DIFFERENTIAL - Abnormal; Notable for the following components:       Result Value    WBC 14.02 (*)     Gran # (ANC) 10.6 (*)     Immature Grans (Abs) 0.05 (*)     Mono # 1.1 (*)     Gran % 75.5 (*)     Lymph % 15.2 (*)     All other components within normal limits   COMPREHENSIVE METABOLIC PANEL - Abnormal; Notable for the following components:    Total Protein 9.0 (*)     Alkaline Phosphatase 54 (*)     All other components within normal limits   LIPASE   URINALYSIS, REFLEX TO URINE CULTURE   ISTAT CREATININE   POCT CREATININE          Imaging Results          CT Abdomen Pelvis With Contrast (Final result)  Result time 06/28/22 18:15:48    Final result by Sesar Davison MD (06/28/22 18:15:48)                 Narrative:    CMS MANDATED QUALITY DATA - CT RADIATION  436    All CT scans at this facility utilize dose modulation, iterative reconstruction, and/or weight based dosing when appropriate to reduce  radiation dose to as low as reasonably achievable.    CT ABDOMEN PELVIS WITH IV CONTRAST    CLINICAL HISTORY:  31 years Male Abdominal abscess/infection suspected    COMPARISON: None    FINDINGS: Lung bases are clear. Bone window images demonstrate no acute or aggressive osseous abnormality.    Borderline hepatic steatosis. No focal hepatic lesion. Gallbladder and biliary tree are unremarkable. Spleen appears normal. Pancreas is unremarkable. Small left upper quadrant splenule. No adrenal lesion. No renal calculi or hydronephrosis. Ureters are normal in caliber. Urinary bladder is within normal limits.    Stomach is unremarkable. No evidence of small bowel obstruction. Appendix appears to be surgically absent. Proximal colon is unremarkable. There is inflammatory stranding and wall thickening involving the proximal descending colon suggesting acute colitis. Distal colon is unremarkable.    No free fluid or free air within the abdomen or pelvis. No pathologically enlarged lymph nodes. Small fat-containing umbilical hernia.    IMPRESSION:    Acute colitis involving a segment of proximal descending colon.    Electronically signed by:  Sesar Davison MD  6/28/2022 6:15 PM CDT Workstation: [id number]                               Medications   metroNIDAZOLE tablet 500 mg (has no administration in time range)   ciprofloxacin HCl tablet 500 mg (has no administration in time range)   iohexoL (OMNIPAQUE 350) injection 100 mL (100 mLs Intravenous Given 6/28/22 7430)     Medical Decision Making:   Initial Assessment:   No apparent distress  Differential Diagnosis:   Considerations include but are not limited to acute diverticulitis, abscess, micro perforation, colitis, electrolyte abnormalities, dehydration  Clinical Tests:   Lab Tests: Reviewed and Ordered  Radiological Study: Reviewed and Ordered  Medical Tests: Reviewed and Ordered  ED Management:  In the emergency department patient found to have no evidence of abscess or  perforation.  Patient indeed does have colitis.  Blood work was reviewed.  Patient has leukocytosis.  He did receive order for Flagyl and Cipro in the emergency department and was E prescribed medications.  Patient has a nonacute abdomen is safe for discharge.  Patient be discharged stable condition.  Detailed return precautions discussed.             ED Course as of 06/28/22 1836 Tue Jun 28, 2022   1732 WBC(!): 14.02 [JL]      ED Course User Index  [JL] Lebron Anderson MD             Clinical Impression:   Final diagnoses:  [K52.9] Colitis (Primary)          ED Disposition Condition    Discharge Stable        ED Prescriptions     Medication Sig Dispense Start Date End Date Auth. Provider    metroNIDAZOLE (FLAGYL) 500 MG tablet Take 1 tablet (500 mg total) by mouth 3 (three) times daily. for 10 days 30 tablet 6/28/2022 7/8/2022 Joey Rasmussen MD    ciprofloxacin HCl (CIPRO) 500 MG tablet Take 1 tablet (500 mg total) by mouth 2 (two) times daily. for 10 days 20 tablet 6/28/2022 7/8/2022 Joey Rasmussen MD        Follow-up Information     Follow up With Specialties Details Why Contact Info    [facility] In 1 week  [address] [facility] [address] [phone]             Joey Rasmussen MD  06/28/22 1836

## 2022-06-28 NOTE — ED PROVIDER NOTES
Encounter Date: 6/28/2022       History     Chief Complaint   Patient presents with    Abdominal Pain     L lower abd pain since yesterday     HPI     32 y/o M who presents to the ED for acute LLQ pain. Symptoms started yesterday without inciting event. Pain was persistent and felt like cramping, had a BM today and has had worsening pain. He endorses it as sharp, worsened with movement, and no alleviating factors. Denies any diarrhea, has had normal BM, denies any blood in stool and had isolated episode of nausea that resolved and reports no nausea at this time.      Review of patient's allergies indicates:  No Known Allergies  Past Medical History:   Diagnosis Date    ADD (attention deficit disorder)     Class 2 obesity without serious comorbidity with body mass index (BMI) of 37.0 to 37.9 in adult 2/19/2018    GERD (gastroesophageal reflux disease)     HARRIS (obstructive sleep apnea)      Past Surgical History:   Procedure Laterality Date    APPENDECTOMY      FUNCTIONAL ENDOSCOPIC SINUS SURGERY (FESS) USING COMPUTER-ASSISTED NAVIGATION N/A 3/17/2022    Procedure: FESS, USING COMPUTER-ASSISTED NAVIGATION Disc downloaded;  Surgeon: Jacob Velasquez MD;  Location: [facility] OR;  Service: ENT;  Laterality: N/A;    NASAL SEPTOPLASTY N/A 3/17/2022    Procedure: SEPTOPLASTY, NOSE;  Surgeon: Jacob Velasquez MD;  Location: [facility] OR;  Service: ENT;  Laterality: N/A;    SURGICAL REMOVAL OF NASAL TURBINATE Bilateral 3/17/2022    Procedure: EXCISION, NASAL TURBINATE;  Surgeon: Jacob Velasquez MD;  Location: [facility] OR;  Service: ENT;  Laterality: Bilateral;    WISDOM TOOTH EXTRACTION       Family History   Problem Relation Age of Onset    DIANA disease Father         severe    Hypertension Brother      Social History     Tobacco Use    Smoking status: Former Smoker    Smokeless tobacco: Former User    Tobacco comment: about 2 years and quit 2007   Substance Use Topics    Alcohol use: Yes     Comment: rarely     Drug use: No     Review of Systems   Constitutional: Negative for diaphoresis and fever.   HENT: Negative for congestion and sore throat.    Eyes: Negative for pain and visual disturbance.   Respiratory: Negative for cough and shortness of breath.    Cardiovascular: Negative for chest pain and palpitations.   Gastrointestinal: Positive for abdominal pain. Negative for nausea and vomiting.   Genitourinary: Negative for dysuria and flank pain.   Musculoskeletal: Negative for joint swelling and myalgias.   Skin: Negative for rash and wound.   Neurological: Negative for dizziness and syncope.   Psychiatric/Behavioral: Negative for agitation and confusion.       Physical Exam     Initial Vitals [06/28/22 1558]   BP Pulse Resp Temp SpO2   (!) 141/87 106 16 98.8 °F (37.1 °C) 97 %      MAP       --         Physical Exam    Vitals reviewed.  Constitutional: He is not diaphoretic. No distress.   HENT:   Head: Atraumatic.   Right Ear: External ear normal.   Left Ear: External ear normal.   Mouth/Throat: Oropharynx is clear and moist.   Eyes: EOM are normal. Pupils are equal, round, and reactive to light.   Neck: Neck supple.   Normal range of motion.  Cardiovascular: Normal rate, regular rhythm and normal heart sounds.   No murmur heard.  Pulmonary/Chest: Breath sounds normal. No stridor. No respiratory distress.   Abdominal: Abdomen is soft. Bowel sounds are normal. There is abdominal tenderness (LUQ tenderness).   Musculoskeletal:         General: No tenderness. Normal range of motion.      Cervical back: Normal range of motion and neck supple.     Neurological: He is alert and oriented to person, place, and time. He has normal strength. GCS score is 15. GCS eye subscore is 4. GCS verbal subscore is 5. GCS motor subscore is 6.   Skin: Skin is warm and dry. Capillary refill takes less than 2 seconds. No rash noted.   Psychiatric: He has a normal mood and affect. His behavior is normal. Judgment and thought content normal.          ED Course   Procedures  Labs Reviewed   CBC W/ AUTO DIFFERENTIAL   COMPREHENSIVE METABOLIC PANEL   LIPASE   URINALYSIS, REFLEX TO URINE CULTURE   POCT CREATININE          Imaging Results    None          Medications - No data to display  Medical Decision Making:   Initial Assessment:   30 y/o M presenting to ED for evaluation of progressively worsening Left sided abdominal pain, patient tachycardic but afebrile and hemodynamically stable with tenderness to palpation of left abdomen  Differential Diagnosis:   Diverticulitis, colitis, IBD, splenomegaly,   ED Management:  Patient CT notable for colitis without perforation, patient treated with antibiotics and appears stable for outpatient management. Will  on return precautions and encourage outpatient follow up.    Lebron Anderosn M.D.  Emergency Medicine PGY3  6:29 PM               ED Course as of 06/28/22 1829   Tue Jun 28, 2022   1732 WBC(!): 14.02 [JL]      ED Course User Index  [JL] Lebron Anderson MD             Clinical Impression:   Final diagnoses:  [K52.9] Colitis (Primary)                 Lebron Anderson MD  Resident  06/28/22 0451

## 2022-07-28 ENCOUNTER — TELEPHONE (OUTPATIENT)
Dept: FAMILY MEDICINE | Facility: CLINIC | Age: 32
End: 2022-07-28

## 2022-07-28 DIAGNOSIS — E78.1 HYPERTRIGLYCERIDEMIA: Primary | ICD-10-CM

## 2022-07-28 DIAGNOSIS — E78.2 MIXED HYPERLIPIDEMIA: ICD-10-CM

## 2022-09-30 LAB
ALBUMIN SERPL-MCNC: 4.5 G/DL (ref 3.6–5.1)
ALBUMIN/GLOB SERPL: 1.6 (CALC) (ref 1–2.5)
ALP SERPL-CCNC: 50 U/L (ref 36–130)
ALT SERPL-CCNC: 39 U/L (ref 9–46)
AST SERPL-CCNC: 18 U/L (ref 10–40)
BILIRUB SERPL-MCNC: 0.6 MG/DL (ref 0.2–1.2)
BUN SERPL-MCNC: 17 MG/DL (ref 7–25)
BUN/CREAT SERPL: ABNORMAL (CALC) (ref 6–22)
CALCIUM SERPL-MCNC: 9.6 MG/DL (ref 8.6–10.3)
CHLORIDE SERPL-SCNC: 105 MMOL/L (ref 98–110)
CHOLEST SERPL-MCNC: 207 MG/DL
CHOLEST/HDLC SERPL: 4.6 (CALC)
CO2 SERPL-SCNC: 25 MMOL/L (ref 20–32)
CREAT SERPL-MCNC: 1 MG/DL (ref 0.6–1.26)
EGFR: 101 ML/MIN/1.73M2
GLOBULIN SER CALC-MCNC: 2.8 G/DL (CALC) (ref 1.9–3.7)
GLUCOSE SERPL-MCNC: 112 MG/DL (ref 65–99)
HDLC SERPL-MCNC: 45 MG/DL
LDLC SERPL CALC-MCNC: 125 MG/DL (CALC)
NONHDLC SERPL-MCNC: 162 MG/DL (CALC)
POTASSIUM SERPL-SCNC: 4.1 MMOL/L (ref 3.5–5.3)
PROT SERPL-MCNC: 7.3 G/DL (ref 6.1–8.1)
SODIUM SERPL-SCNC: 140 MMOL/L (ref 135–146)
TRIGL SERPL-MCNC: 230 MG/DL

## 2022-10-03 ENCOUNTER — OFFICE VISIT (OUTPATIENT)
Dept: FAMILY MEDICINE | Facility: CLINIC | Age: 32
End: 2022-10-03
Payer: COMMERCIAL

## 2022-10-03 VITALS
HEIGHT: 70 IN | WEIGHT: 265 LBS | HEART RATE: 114 BPM | TEMPERATURE: 99 F | DIASTOLIC BLOOD PRESSURE: 82 MMHG | SYSTOLIC BLOOD PRESSURE: 136 MMHG | BODY MASS INDEX: 37.94 KG/M2 | OXYGEN SATURATION: 97 %

## 2022-10-03 DIAGNOSIS — R53.83 FATIGUE, UNSPECIFIED TYPE: ICD-10-CM

## 2022-10-03 DIAGNOSIS — E66.01 CLASS 2 SEVERE OBESITY DUE TO EXCESS CALORIES WITH SERIOUS COMORBIDITY AND BODY MASS INDEX (BMI) OF 38.0 TO 38.9 IN ADULT: ICD-10-CM

## 2022-10-03 DIAGNOSIS — E78.2 MIXED HYPERLIPIDEMIA: ICD-10-CM

## 2022-10-03 DIAGNOSIS — E78.1 HYPERTRIGLYCERIDEMIA: Primary | ICD-10-CM

## 2022-10-03 DIAGNOSIS — R73.01 IMPAIRED FASTING GLUCOSE: ICD-10-CM

## 2022-10-03 DIAGNOSIS — Z23 NEED FOR INFLUENZA VACCINATION: ICD-10-CM

## 2022-10-03 PROCEDURE — 3079F PR MOST RECENT DIASTOLIC BLOOD PRESSURE 80-89 MM HG: ICD-10-PCS | Mod: CPTII,S$GLB,, | Performed by: NURSE PRACTITIONER

## 2022-10-03 PROCEDURE — 3079F DIAST BP 80-89 MM HG: CPT | Mod: CPTII,S$GLB,, | Performed by: NURSE PRACTITIONER

## 2022-10-03 PROCEDURE — 90471 IMMUNIZATION ADMIN: CPT | Mod: S$GLB,,, | Performed by: NURSE PRACTITIONER

## 2022-10-03 PROCEDURE — 3008F BODY MASS INDEX DOCD: CPT | Mod: CPTII,S$GLB,, | Performed by: NURSE PRACTITIONER

## 2022-10-03 PROCEDURE — 3075F PR MOST RECENT SYSTOLIC BLOOD PRESS GE 130-139MM HG: ICD-10-PCS | Mod: CPTII,S$GLB,, | Performed by: NURSE PRACTITIONER

## 2022-10-03 PROCEDURE — 90686 IIV4 VACC NO PRSV 0.5 ML IM: CPT | Mod: S$GLB,,, | Performed by: NURSE PRACTITIONER

## 2022-10-03 PROCEDURE — 1160F PR REVIEW ALL MEDS BY PRESCRIBER/CLIN PHARMACIST DOCUMENTED: ICD-10-PCS | Mod: CPTII,S$GLB,, | Performed by: NURSE PRACTITIONER

## 2022-10-03 PROCEDURE — 99214 OFFICE O/P EST MOD 30 MIN: CPT | Mod: 25,S$GLB,, | Performed by: NURSE PRACTITIONER

## 2022-10-03 PROCEDURE — 1160F RVW MEDS BY RX/DR IN RCRD: CPT | Mod: CPTII,S$GLB,, | Performed by: NURSE PRACTITIONER

## 2022-10-03 PROCEDURE — 3008F PR BODY MASS INDEX (BMI) DOCUMENTED: ICD-10-PCS | Mod: CPTII,S$GLB,, | Performed by: NURSE PRACTITIONER

## 2022-10-03 PROCEDURE — 90686 FLU VACCINE (QUAD) GREATER THAN OR EQUAL TO 3YO PRESERVATIVE FREE IM: ICD-10-PCS | Mod: S$GLB,,, | Performed by: NURSE PRACTITIONER

## 2022-10-03 PROCEDURE — 90471 FLU VACCINE (QUAD) GREATER THAN OR EQUAL TO 3YO PRESERVATIVE FREE IM: ICD-10-PCS | Mod: S$GLB,,, | Performed by: NURSE PRACTITIONER

## 2022-10-03 PROCEDURE — 3075F SYST BP GE 130 - 139MM HG: CPT | Mod: CPTII,S$GLB,, | Performed by: NURSE PRACTITIONER

## 2022-10-03 PROCEDURE — 99214 PR OFFICE/OUTPT VISIT, EST, LEVL IV, 30-39 MIN: ICD-10-PCS | Mod: 25,S$GLB,, | Performed by: NURSE PRACTITIONER

## 2022-10-03 PROCEDURE — 1159F PR MEDICATION LIST DOCUMENTED IN MEDICAL RECORD: ICD-10-PCS | Mod: CPTII,S$GLB,, | Performed by: NURSE PRACTITIONER

## 2022-10-03 PROCEDURE — 1159F MED LIST DOCD IN RCRD: CPT | Mod: CPTII,S$GLB,, | Performed by: NURSE PRACTITIONER

## 2022-10-03 RX ORDER — ICOSAPENT ETHYL 1000 MG/1
2 CAPSULE ORAL 2 TIMES DAILY
Qty: 360 CAPSULE | Refills: 1 | Status: SHIPPED | OUTPATIENT
Start: 2022-10-03

## 2022-10-03 RX ORDER — LISDEXAMFETAMINE DIMESYLATE 70 MG/1
70 CAPSULE ORAL DAILY PRN
COMMUNITY
Start: 2022-09-23

## 2022-10-03 NOTE — PROGRESS NOTES
Subjective:       Patient ID: Allan Donahue is a 32 y.o. male.    Chief Complaint: Hyperlipidemia and Follow-up    Patient presents today following up for impaired fasting glucose, cholesterol, and chronic sinus problems.   SI have seen this patient last, he has had nasal surgery and tonsillectomy.  Patient had this off completed due to difficulty breathing and sinus issues.  He is breathing better and is still using his CPAP machine.  Patient is seeing Sleep Medicine and still states that he is not sleeping well and feeling rested when he wakes in the morning.  Patient has an appointment for follow-up this week.    Blood sugar is still elevated while fasting.  Patient will be educated on dietary maintenance of blood sugar today.    Cholesterol has improved.  Patient has taken this see but sporadically and cholesterol has improved but is still not normal.  Patient will be educated on consistency with this medication and taking as prescribed.    Patient is obese with a BMI of 38.02    Hyperlipidemia  This is a recurrent problem. The current episode started more than 1 month ago. The problem is uncontrolled. Recent lipid tests were reviewed and are high. Exacerbating diseases include obesity. He has no history of hypothyroidism. Factors aggravating his hyperlipidemia include fatty foods. Pertinent negatives include no chest pain, focal weakness, leg pain, myalgias or shortness of breath. He is currently on no antihyperlipidemic treatment. The current treatment provides no improvement of lipids. Compliance problems include adherence to exercise and adherence to diet.  Risk factors for coronary artery disease include dyslipidemia, obesity and male sex.   Sinus Problem  This is a recurrent problem. The current episode started more than 1 month ago. The problem has been waxing and waning since onset. There has been no fever. The pain is mild. Associated symptoms include headaches. Pertinent negatives include no chills,  congestion, coughing, diaphoresis, ear pain, shortness of breath, sinus pressure, sneezing or sore throat. Past treatments include saline sprays and oral decongestants (flonase). The treatment provided moderate relief.   Review of Systems   Constitutional:  Negative for activity change, appetite change, chills, diaphoresis, fatigue, fever and unexpected weight change.        Obesity   HENT:  Negative for congestion, ear discharge, ear pain, hearing loss, postnasal drip, sinus pressure, sinus pain, sneezing, sore throat, trouble swallowing and voice change.    Eyes:  Negative for photophobia, pain and visual disturbance.   Respiratory:  Negative for cough, chest tightness and shortness of breath.    Cardiovascular:  Negative for chest pain, palpitations and leg swelling.        Hyperlipidemia   Gastrointestinal:  Negative for abdominal pain, constipation, diarrhea, nausea and vomiting.   Endocrine: Negative for cold intolerance and heat intolerance.        Impaired fasting glucose   Genitourinary:  Negative for difficulty urinating, dysuria and flank pain.   Musculoskeletal:  Negative for arthralgias, gait problem and myalgias.   Skin:  Negative for rash.   Allergic/Immunologic: Negative for immunocompromised state.   Neurological:  Positive for headaches. Negative for dizziness, focal weakness and weakness.   Hematological:  Negative for adenopathy.   Psychiatric/Behavioral:  Negative for agitation, confusion, self-injury and suicidal ideas.      Past Medical History:   Diagnosis Date    ADD (attention deficit disorder)     Class 2 obesity without serious comorbidity with body mass index (BMI) of 37.0 to 37.9 in adult 2/19/2018    GERD (gastroesophageal reflux disease)     HARRIS (obstructive sleep apnea)       Past Surgical History:   Procedure Laterality Date    APPENDECTOMY      FUNCTIONAL ENDOSCOPIC SINUS SURGERY (FESS) USING COMPUTER-ASSISTED NAVIGATION N/A 3/17/2022    Procedure: FESS, USING COMPUTER-ASSISTED  "NAVIGATION Disc downloaded;  Surgeon: Jacob Velasquez MD;  Location: [facility] OR;  Service: ENT;  Laterality: N/A;    NASAL SEPTOPLASTY N/A 3/17/2022    Procedure: SEPTOPLASTY, NOSE;  Surgeon: Jacob Velasquez MD;  Location: [facility] OR;  Service: ENT;  Laterality: N/A;    SURGICAL REMOVAL OF NASAL TURBINATE Bilateral 3/17/2022    Procedure: EXCISION, NASAL TURBINATE;  Surgeon: Jacob Velasquez MD;  Location: [facility] OR;  Service: ENT;  Laterality: Bilateral;    WISDOM TOOTH EXTRACTION         Family History   Problem Relation Age of Onset    DIANA disease Father         severe    Hypertension Brother        Social History     Socioeconomic History    Marital status: Single   Occupational History    Occupation: technician   Tobacco Use    Smoking status: Former    Smokeless tobacco: Former    Tobacco comments:     about 2 years and quit 2007   Substance and Sexual Activity    Alcohol use: Yes     Comment: rarely    Drug use: No    Sexual activity: Yes     Partners: Female       Current Outpatient Medications   Medication Sig Dispense Refill    VYVANSE 70 mg capsule Take 70 mg by mouth daily as needed.      VASCEPA 1 gram Cap Take 2 capsules (2 g total) by mouth 2 (two) times daily. 360 capsule 1     No current facility-administered medications for this visit.       Review of patient's allergies indicates:  No Known Allergies  Objective:      Blood pressure 136/82, pulse (!) 114, temperature 98.6 °F (37 °C), height 5' 10" (1.778 m), weight 120.2 kg (265 lb), SpO2 97 %. Body mass index is 38.02 kg/m².   Physical Exam  Vitals and nursing note reviewed.   Constitutional:       General: He is not in acute distress.     Appearance: Normal appearance. He is well-developed. He is obese. He is not ill-appearing.   HENT:      Head: Normocephalic and atraumatic.      Right Ear: Ear canal and external ear normal.      Left Ear: Ear canal and external ear normal.      Nose: Nose normal.      Mouth/Throat:      Mouth: Mucous " membranes are moist.      Pharynx: Uvula midline. No oropharyngeal exudate.   Eyes:      General: Lids are normal.      Extraocular Movements: Extraocular movements intact.      Conjunctiva/sclera: Conjunctivae normal.      Pupils: Pupils are equal, round, and reactive to light.   Cardiovascular:      Rate and Rhythm: Normal rate and regular rhythm.      Pulses: Normal pulses.      Heart sounds: Normal heart sounds, S1 normal and S2 normal. No murmur heard.  Pulmonary:      Effort: Pulmonary effort is normal. No respiratory distress.      Breath sounds: Normal breath sounds. No wheezing.   Abdominal:      General: Bowel sounds are normal.      Palpations: Abdomen is soft.      Tenderness: There is no abdominal tenderness.   Musculoskeletal:         General: Normal range of motion.      Cervical back: Normal range of motion and neck supple.   Lymphadenopathy:      Cervical: No cervical adenopathy.   Skin:     General: Skin is warm and dry.      Findings: No rash.   Neurological:      General: No focal deficit present.      Mental Status: He is alert and oriented to person, place, and time. Mental status is at baseline.      Cranial Nerves: No cranial nerve deficit.   Psychiatric:         Mood and Affect: Mood normal.         Speech: Speech normal.         Behavior: Behavior normal.         Thought Content: Thought content normal.         Judgment: Judgment normal.           Assessment:       1. Hypertriglyceridemia    2. Mixed hyperlipidemia    3. Impaired fasting glucose    4. Fatigue, unspecified type    5. Need for influenza vaccination    6. Class 2 severe obesity due to excess calories with serious comorbidity and body mass index (BMI) of 38.0 to 38.9 in adult        Plan:       Allan was seen today for hyperlipidemia and follow-up.    Diagnoses and all orders for this visit:    Hypertriglyceridemia  -     VASCEPA 1 gram Cap; Take 2 capsules (2 g total) by mouth 2 (two) times daily.    Mixed hyperlipidemia  -      VASCEPA 1 gram Cap; Take 2 capsules (2 g total) by mouth 2 (two) times daily.  -     Lipid Panel; Future  -     Lipid Panel    Limit red meat, butter, fried foods, cheese, and other foods that have a lot of saturated fat. Consume more: lean meats, fish, fruits, vegetables, whole grains, beans, lentils, and nuts.  Weight loss, and 30-45 min of cardiovascular exercise daily.      Impaired fasting glucose  -     Hemoglobin A1C; Future  -     Comprehensive Metabolic Panel; Future  -     Hemoglobin A1C  -     Comprehensive Metabolic Panel     Discussed the following complications of DM Type 2: CAD, CVD, Retinopathy, Nephropathy, Neuropathy.    Discussed Target A1C Goal <7.0% and Target fasting blood sugars () before each meal.    Discussed Lifestyle Modifications: Low glycemic index diet, Exercise (30-45 min) daily, Weight loss    Fatigue, unspecified type  -     TSH; Future  -     TSH    Need for influenza vaccination  -     Influenza - Quadrivalent (PF)  Completed    Class 2 severe obesity due to excess calories with serious comorbidity and body mass index (BMI) of 38.0 to 38.9 in adult   The patient is asked to make an attempt to improve diet and exercise patterns to aid in medical management of this problem.    I spent a total of 32 minutes on the day of the visit.This includes face to face time and non-face to face time preparing to see the patient (eg, review of tests), obtaining and/or reviewing separately obtained history, documenting clinical information in the electronic or other health record, independently interpreting results and communicating results to the patient/family/caregiver, or care coordinator.

## 2023-12-08 DIAGNOSIS — R53.83 FATIGUE: ICD-10-CM

## 2023-12-08 DIAGNOSIS — G47.19 EXCESSIVE DAYTIME SLEEPINESS: ICD-10-CM

## 2023-12-08 DIAGNOSIS — R06.83 SNORING: ICD-10-CM

## 2023-12-08 DIAGNOSIS — G47.33 OBSTRUCTIVE SLEEP APNEA: Primary | ICD-10-CM

## 2024-01-24 ENCOUNTER — PROCEDURE VISIT (OUTPATIENT)
Dept: SLEEP MEDICINE | Facility: HOSPITAL | Age: 34
End: 2024-01-24
Attending: INTERNAL MEDICINE
Payer: COMMERCIAL

## 2024-01-24 DIAGNOSIS — R53.83 FATIGUE: ICD-10-CM

## 2024-01-24 DIAGNOSIS — G47.19 EXCESSIVE DAYTIME SLEEPINESS: ICD-10-CM

## 2024-01-24 DIAGNOSIS — G47.33 OBSTRUCTIVE SLEEP APNEA: ICD-10-CM

## 2024-01-24 DIAGNOSIS — R06.83 SNORING: ICD-10-CM

## 2024-01-24 PROCEDURE — 95806 SLEEP STUDY UNATT&RESP EFFT: CPT

## 2024-01-31 DIAGNOSIS — G47.33 OSA (OBSTRUCTIVE SLEEP APNEA): Primary | ICD-10-CM

## 2024-01-31 DIAGNOSIS — R53.83 LOSS OF ENERGY: ICD-10-CM

## 2024-01-31 DIAGNOSIS — R06.83 SNORING: ICD-10-CM

## 2024-02-26 ENCOUNTER — PROCEDURE VISIT (OUTPATIENT)
Dept: SLEEP MEDICINE | Facility: HOSPITAL | Age: 34
End: 2024-02-26
Attending: INTERNAL MEDICINE
Payer: COMMERCIAL

## 2024-02-26 DIAGNOSIS — G47.33 OSA (OBSTRUCTIVE SLEEP APNEA): ICD-10-CM

## 2024-02-26 DIAGNOSIS — R06.83 SNORING: ICD-10-CM

## 2024-02-26 DIAGNOSIS — R53.83 LOSS OF ENERGY: ICD-10-CM

## 2024-02-26 PROCEDURE — 95810 POLYSOM 6/> YRS 4/> PARAM: CPT

## 2024-03-18 DIAGNOSIS — G47.33 OBSTRUCTIVE SLEEP APNEA: Primary | ICD-10-CM

## 2024-03-18 DIAGNOSIS — R53.83 FATIGUE: ICD-10-CM

## 2024-03-18 DIAGNOSIS — R06.83 SNORING: ICD-10-CM

## 2024-04-01 ENCOUNTER — PROCEDURE VISIT (OUTPATIENT)
Dept: SLEEP MEDICINE | Facility: HOSPITAL | Age: 34
End: 2024-04-01
Attending: INTERNAL MEDICINE
Payer: COMMERCIAL

## 2024-04-01 DIAGNOSIS — G47.33 OBSTRUCTIVE SLEEP APNEA: ICD-10-CM

## 2024-04-01 DIAGNOSIS — R53.83 FATIGUE: ICD-10-CM

## 2024-04-01 DIAGNOSIS — R06.83 SNORING: ICD-10-CM

## 2024-04-01 PROCEDURE — 95811 POLYSOM 6/>YRS CPAP 4/> PARM: CPT

## 2024-07-09 ENCOUNTER — PATIENT MESSAGE (OUTPATIENT)
Dept: ADMINISTRATIVE | Facility: HOSPITAL | Age: 34
End: 2024-07-09
Payer: COMMERCIAL

## 2025-06-05 ENCOUNTER — TELEPHONE (OUTPATIENT)
Dept: OPHTHALMOLOGY | Facility: CLINIC | Age: 35
End: 2025-06-05
Payer: COMMERCIAL

## 2025-08-20 ENCOUNTER — PATIENT MESSAGE (OUTPATIENT)
Dept: ADMINISTRATIVE | Facility: HOSPITAL | Age: 35
End: 2025-08-20
Payer: COMMERCIAL

## (undated) DEVICE — TUBE NG PREVENT FILTER 14FR

## (undated) DEVICE — NDL 27G X 1 1/4

## (undated) DEVICE — SYR ONLY ET 20CC

## (undated) DEVICE — GLOVE SURG ULTRA TOUCH 7

## (undated) DEVICE — SHEET DRAPE MEDIUM

## (undated) DEVICE — BLADE SURG #15 CARBON STEEL

## (undated) DEVICE — SUT ETHILON 2-0 BLK MONO K

## (undated) DEVICE — TUBE SUMP NASOGASTRIC 14F

## (undated) DEVICE — TRACKER PATIENT NON INVASIVE

## (undated) DEVICE — DRESSING TELFA STRL 4X3 LF

## (undated) DEVICE — SUT PLAIN 4-0 SC-1 18IN

## (undated) DEVICE — TUBING SUCTION STR .25INX6FT

## (undated) DEVICE — SUT 5/0 18IN PLAIN FAST AB

## (undated) DEVICE — DRESSING EYE OVAL LF

## (undated) DEVICE — PACK HEAD & NECK

## (undated) DEVICE — SOL NS 1000CC

## (undated) DEVICE — SPONGE PATTY SURGICAL .5X3IN

## (undated) DEVICE — BLADE INFERIOR TURBINATE 2MM

## (undated) DEVICE — TUBING SUCTION 3/16X6 2 CONN

## (undated) DEVICE — KIT ANTIFOG W/SPONG & FLUID

## (undated) DEVICE — TRACKER ENT INSTRUMENT

## (undated) DEVICE — SUT PLN GUT 4-0 SC-1SC-1 1

## (undated) DEVICE — SYS LABEL CORRECT MED

## (undated) DEVICE — BLANKET FULL BODY 85.8X50IN

## (undated) DEVICE — COLLECTOR SPECIMAN ARTHRO

## (undated) DEVICE — SYR 10CC LUER LOCK

## (undated) DEVICE — BLADE TRICUT

## (undated) DEVICE — SEE L#120831

## (undated) DEVICE — NDL HYPODERMIC BLUNT 18G 1.5IN

## (undated) DEVICE — STAPLER SKIN ROTATING HEAD